# Patient Record
Sex: FEMALE | Race: OTHER | HISPANIC OR LATINO
[De-identification: names, ages, dates, MRNs, and addresses within clinical notes are randomized per-mention and may not be internally consistent; named-entity substitution may affect disease eponyms.]

---

## 2017-09-21 ENCOUNTER — RESULT REVIEW (OUTPATIENT)
Age: 32
End: 2017-09-21

## 2018-01-28 ENCOUNTER — EMERGENCY (EMERGENCY)
Facility: HOSPITAL | Age: 33
LOS: 1 days | Discharge: HOME | End: 2018-01-28
Admitting: EMERGENCY MEDICINE
Payer: MEDICAID

## 2018-01-28 VITALS
HEART RATE: 74 BPM | OXYGEN SATURATION: 98 % | RESPIRATION RATE: 18 BRPM | DIASTOLIC BLOOD PRESSURE: 68 MMHG | SYSTOLIC BLOOD PRESSURE: 107 MMHG | HEIGHT: 61 IN | TEMPERATURE: 99 F | WEIGHT: 130.07 LBS

## 2018-01-28 DIAGNOSIS — H60.01 ABSCESS OF RIGHT EXTERNAL EAR: ICD-10-CM

## 2018-01-28 DIAGNOSIS — Z88.0 ALLERGY STATUS TO PENICILLIN: ICD-10-CM

## 2018-01-28 DIAGNOSIS — H92.01 OTALGIA, RIGHT EAR: ICD-10-CM

## 2018-01-28 DIAGNOSIS — Z79.2 LONG TERM (CURRENT) USE OF ANTIBIOTICS: ICD-10-CM

## 2018-01-28 DIAGNOSIS — Z79.1 LONG TERM (CURRENT) USE OF NON-STEROIDAL ANTI-INFLAMMATORIES (NSAID): ICD-10-CM

## 2018-01-28 PROCEDURE — 99283 EMERGENCY DEPT VISIT LOW MDM: CPT

## 2018-01-28 PROCEDURE — 99284 EMERGENCY DEPT VISIT MOD MDM: CPT

## 2018-01-28 PROCEDURE — 87186 SC STD MICRODIL/AGAR DIL: CPT

## 2018-01-28 PROCEDURE — 87205 SMEAR GRAM STAIN: CPT

## 2018-01-28 PROCEDURE — 87070 CULTURE OTHR SPECIMN AEROBIC: CPT

## 2018-01-28 RX ORDER — AZTREONAM 2 G
1 VIAL (EA) INJECTION
Qty: 20 | Refills: 0 | OUTPATIENT
Start: 2018-01-28 | End: 2018-02-06

## 2018-01-28 RX ORDER — IBUPROFEN 200 MG
600 TABLET ORAL ONCE
Qty: 0 | Refills: 0 | Status: COMPLETED | OUTPATIENT
Start: 2018-01-28 | End: 2018-01-28

## 2018-01-28 RX ORDER — CIPROFLOXACIN AND DEXAMETHASONE 3; 1 MG/ML; MG/ML
4 SUSPENSION/ DROPS AURICULAR (OTIC)
Qty: 1 | Refills: 0 | OUTPATIENT
Start: 2018-01-28 | End: 2018-02-03

## 2018-01-28 RX ORDER — IBUPROFEN 200 MG
1 TABLET ORAL
Qty: 30 | Refills: 0 | OUTPATIENT
Start: 2018-01-28 | End: 2018-02-06

## 2018-01-28 RX ADMIN — Medication 1 TABLET(S): at 18:44

## 2018-01-28 RX ADMIN — Medication 600 MILLIGRAM(S): at 18:39

## 2018-01-28 NOTE — ED PROVIDER NOTE - MEDICAL DECISION MAKING DETAILS
+ right earache with abscess to ear canal. + yellow pus expressed with insertion of speculum from visible abscess at opening of canal. improvement in swelling. will tx with bactrim, ciprodex and wound check in 24 hrs. return precautions d/w pt. cx sent.

## 2018-01-28 NOTE — ED PROVIDER NOTE - OBJECTIVE STATEMENT
33 y/o female with no sig PMHx c/o right earache x 3 days. Pt states ear has become swollen and painful. no d/c. + decrease hearing on right and subj fever. no URI sx's. no ha. no further complaints.

## 2018-01-28 NOTE — ED PROVIDER NOTE - ENMT, MLM
Airway patent, Nasal mucosa clear. Mouth with normal mucosa. Throat has no vesicles, no oropharyngeal exudates and uvula is midline. left tm unable to visualize due to cerumen impaction. no canal swelling. no d/c. right ear + swelling to meatus and canal. + pus expresses upon insertion of speculum. unable to visualize TM due to d/c and swelling. no erythema. no outer ear swelling. no mastoid tenderness.

## 2018-01-28 NOTE — ED ADULT NURSE NOTE - OBJECTIVE STATEMENT
Pt w no PMH presents to ED c/o R ear pain radiating down her jaw, feeling "shut", decreased hearing for 2-3 days. Also reports subjective fever last night. Denies taking any OTC analgesics for pain.

## 2018-01-29 LAB
GRAM STN FLD: SIGNIFICANT CHANGE UP
SPECIMEN SOURCE: SIGNIFICANT CHANGE UP

## 2018-02-01 LAB
-  CEFAZOLIN: SIGNIFICANT CHANGE UP
-  CLINDAMYCIN: SIGNIFICANT CHANGE UP
-  ERYTHROMYCIN: SIGNIFICANT CHANGE UP
-  LINEZOLID: SIGNIFICANT CHANGE UP
-  OXACILLIN: SIGNIFICANT CHANGE UP
-  RIFAMPIN: SIGNIFICANT CHANGE UP
-  TRIMETHOPRIM/SULFAMETHOXAZOLE: SIGNIFICANT CHANGE UP
-  VANCOMYCIN: SIGNIFICANT CHANGE UP
CULTURE RESULTS: SIGNIFICANT CHANGE UP
METHOD TYPE: SIGNIFICANT CHANGE UP
ORGANISM # SPEC MICROSCOPIC CNT: SIGNIFICANT CHANGE UP
ORGANISM # SPEC MICROSCOPIC CNT: SIGNIFICANT CHANGE UP
SPECIMEN SOURCE: SIGNIFICANT CHANGE UP

## 2018-03-25 ENCOUNTER — EMERGENCY (EMERGENCY)
Facility: HOSPITAL | Age: 33
LOS: 1 days | Discharge: HOME | End: 2018-03-25
Attending: EMERGENCY MEDICINE | Admitting: EMERGENCY MEDICINE
Payer: MEDICAID

## 2018-03-25 VITALS
DIASTOLIC BLOOD PRESSURE: 56 MMHG | HEART RATE: 60 BPM | TEMPERATURE: 99 F | OXYGEN SATURATION: 100 % | SYSTOLIC BLOOD PRESSURE: 91 MMHG | RESPIRATION RATE: 16 BRPM

## 2018-03-25 VITALS
OXYGEN SATURATION: 99 % | SYSTOLIC BLOOD PRESSURE: 103 MMHG | DIASTOLIC BLOOD PRESSURE: 62 MMHG | RESPIRATION RATE: 18 BRPM | WEIGHT: 134.92 LBS | HEART RATE: 62 BPM | HEIGHT: 61 IN | TEMPERATURE: 98 F

## 2018-03-25 DIAGNOSIS — Z3A.01 LESS THAN 8 WEEKS GESTATION OF PREGNANCY: ICD-10-CM

## 2018-03-25 DIAGNOSIS — Z88.0 ALLERGY STATUS TO PENICILLIN: ICD-10-CM

## 2018-03-25 DIAGNOSIS — R11.0 NAUSEA: ICD-10-CM

## 2018-03-25 DIAGNOSIS — Z91.013 ALLERGY TO SEAFOOD: ICD-10-CM

## 2018-03-25 DIAGNOSIS — O26.891 OTHER SPECIFIED PREGNANCY RELATED CONDITIONS, FIRST TRIMESTER: ICD-10-CM

## 2018-03-25 DIAGNOSIS — Z79.899 OTHER LONG TERM (CURRENT) DRUG THERAPY: ICD-10-CM

## 2018-03-25 LAB
ALBUMIN SERPL ELPH-MCNC: 4.4 G/DL — SIGNIFICANT CHANGE UP (ref 3.3–5)
ALP SERPL-CCNC: 42 U/L — SIGNIFICANT CHANGE UP (ref 40–120)
ALT FLD-CCNC: 17 U/L — SIGNIFICANT CHANGE UP (ref 10–45)
ANION GAP SERPL CALC-SCNC: 12 MMOL/L — SIGNIFICANT CHANGE UP (ref 5–17)
APPEARANCE UR: CLEAR — SIGNIFICANT CHANGE UP
AST SERPL-CCNC: 19 U/L — SIGNIFICANT CHANGE UP (ref 10–40)
BACTERIA # UR AUTO: PRESENT /HPF
BASOPHILS NFR BLD AUTO: 0.1 % — SIGNIFICANT CHANGE UP (ref 0–2)
BILIRUB SERPL-MCNC: 0.7 MG/DL — SIGNIFICANT CHANGE UP (ref 0.2–1.2)
BILIRUB UR-MCNC: NEGATIVE — SIGNIFICANT CHANGE UP
BUN SERPL-MCNC: 8 MG/DL — SIGNIFICANT CHANGE UP (ref 7–23)
CALCIUM SERPL-MCNC: 9.2 MG/DL — SIGNIFICANT CHANGE UP (ref 8.4–10.5)
CHLORIDE SERPL-SCNC: 99 MMOL/L — SIGNIFICANT CHANGE UP (ref 96–108)
CO2 SERPL-SCNC: 24 MMOL/L — SIGNIFICANT CHANGE UP (ref 22–31)
COLOR SPEC: YELLOW — SIGNIFICANT CHANGE UP
CREAT SERPL-MCNC: 0.5 MG/DL — SIGNIFICANT CHANGE UP (ref 0.5–1.3)
DIFF PNL FLD: NEGATIVE — SIGNIFICANT CHANGE UP
EOSINOPHIL NFR BLD AUTO: 0.6 % — SIGNIFICANT CHANGE UP (ref 0–6)
EPI CELLS # UR: (no result) /HPF (ref 0–5)
GLUCOSE SERPL-MCNC: 85 MG/DL — SIGNIFICANT CHANGE UP (ref 70–99)
GLUCOSE UR QL: NEGATIVE — SIGNIFICANT CHANGE UP
HCG SERPL-ACNC: HIGH MIU/ML
HCT VFR BLD CALC: 38 % — SIGNIFICANT CHANGE UP (ref 34.5–45)
HGB BLD-MCNC: 13 G/DL — SIGNIFICANT CHANGE UP (ref 11.5–15.5)
KETONES UR-MCNC: >=80 MG/DL
LEUKOCYTE ESTERASE UR-ACNC: NEGATIVE — SIGNIFICANT CHANGE UP
LYMPHOCYTES # BLD AUTO: 32.1 % — SIGNIFICANT CHANGE UP (ref 13–44)
MCHC RBC-ENTMCNC: 31.8 PG — SIGNIFICANT CHANGE UP (ref 27–34)
MCHC RBC-ENTMCNC: 34.2 G/DL — SIGNIFICANT CHANGE UP (ref 32–36)
MCV RBC AUTO: 92.9 FL — SIGNIFICANT CHANGE UP (ref 80–100)
MONOCYTES NFR BLD AUTO: 7.8 % — SIGNIFICANT CHANGE UP (ref 2–14)
NEUTROPHILS NFR BLD AUTO: 59.4 % — SIGNIFICANT CHANGE UP (ref 43–77)
NITRITE UR-MCNC: NEGATIVE — SIGNIFICANT CHANGE UP
PH UR: 7 — SIGNIFICANT CHANGE UP (ref 5–8)
PLATELET # BLD AUTO: 211 K/UL — SIGNIFICANT CHANGE UP (ref 150–400)
POTASSIUM SERPL-MCNC: 4 MMOL/L — SIGNIFICANT CHANGE UP (ref 3.5–5.3)
POTASSIUM SERPL-SCNC: 4 MMOL/L — SIGNIFICANT CHANGE UP (ref 3.5–5.3)
PROT SERPL-MCNC: 7.7 G/DL — SIGNIFICANT CHANGE UP (ref 6–8.3)
PROT UR-MCNC: (no result) MG/DL
RBC # BLD: 4.09 M/UL — SIGNIFICANT CHANGE UP (ref 3.8–5.2)
RBC # FLD: 12.1 % — SIGNIFICANT CHANGE UP (ref 10.3–16.9)
RBC CASTS # UR COMP ASSIST: < 5 /HPF — SIGNIFICANT CHANGE UP
SODIUM SERPL-SCNC: 135 MMOL/L — SIGNIFICANT CHANGE UP (ref 135–145)
SP GR SPEC: 1.02 — SIGNIFICANT CHANGE UP (ref 1–1.03)
UROBILINOGEN FLD QL: 0.2 E.U./DL — SIGNIFICANT CHANGE UP
WBC # BLD: 6.8 K/UL — SIGNIFICANT CHANGE UP (ref 3.8–10.5)
WBC # FLD AUTO: 6.8 K/UL — SIGNIFICANT CHANGE UP (ref 3.8–10.5)
WBC UR QL: < 5 /HPF — SIGNIFICANT CHANGE UP

## 2018-03-25 PROCEDURE — 99284 EMERGENCY DEPT VISIT MOD MDM: CPT

## 2018-03-25 PROCEDURE — 76817 TRANSVAGINAL US OBSTETRIC: CPT | Mod: 26

## 2018-03-25 PROCEDURE — 76801 OB US < 14 WKS SINGLE FETUS: CPT

## 2018-03-25 PROCEDURE — 99284 EMERGENCY DEPT VISIT MOD MDM: CPT | Mod: 25

## 2018-03-25 PROCEDURE — 80053 COMPREHEN METABOLIC PANEL: CPT

## 2018-03-25 PROCEDURE — 87086 URINE CULTURE/COLONY COUNT: CPT

## 2018-03-25 PROCEDURE — 84702 CHORIONIC GONADOTROPIN TEST: CPT

## 2018-03-25 PROCEDURE — 96374 THER/PROPH/DIAG INJ IV PUSH: CPT

## 2018-03-25 PROCEDURE — 85025 COMPLETE CBC W/AUTO DIFF WBC: CPT

## 2018-03-25 PROCEDURE — 76801 OB US < 14 WKS SINGLE FETUS: CPT | Mod: 26

## 2018-03-25 PROCEDURE — 36415 COLL VENOUS BLD VENIPUNCTURE: CPT

## 2018-03-25 PROCEDURE — 81001 URINALYSIS AUTO W/SCOPE: CPT

## 2018-03-25 PROCEDURE — 76817 TRANSVAGINAL US OBSTETRIC: CPT

## 2018-03-25 RX ORDER — SODIUM CHLORIDE 9 MG/ML
1000 INJECTION, SOLUTION INTRAVENOUS
Qty: 0 | Refills: 0 | Status: DISCONTINUED | OUTPATIENT
Start: 2018-03-25 | End: 2018-03-29

## 2018-03-25 RX ORDER — SODIUM CHLORIDE 9 MG/ML
1000 INJECTION INTRAMUSCULAR; INTRAVENOUS; SUBCUTANEOUS ONCE
Qty: 0 | Refills: 0 | Status: COMPLETED | OUTPATIENT
Start: 2018-03-25 | End: 2018-03-25

## 2018-03-25 RX ORDER — METOCLOPRAMIDE HCL 10 MG
10 TABLET ORAL ONCE
Qty: 0 | Refills: 0 | Status: COMPLETED | OUTPATIENT
Start: 2018-03-25 | End: 2018-03-25

## 2018-03-25 RX ORDER — METOCLOPRAMIDE HCL 10 MG
1 TABLET ORAL
Qty: 20 | Refills: 0 | OUTPATIENT
Start: 2018-03-25

## 2018-03-25 RX ADMIN — Medication 10 MILLIGRAM(S): at 09:00

## 2018-03-25 RX ADMIN — SODIUM CHLORIDE 250 MILLILITER(S): 9 INJECTION, SOLUTION INTRAVENOUS at 11:58

## 2018-03-25 RX ADMIN — SODIUM CHLORIDE 1000 MILLILITER(S): 9 INJECTION INTRAMUSCULAR; INTRAVENOUS; SUBCUTANEOUS at 08:59

## 2018-03-25 NOTE — ED ADULT NURSE NOTE - CHPI ED SYMPTOMS NEG
no tingling/no chills/no decreased eating/drinking/no dizziness/no fever/no vomiting/no pain/no numbness

## 2018-03-25 NOTE — ED ADULT NURSE NOTE - CHIEF COMPLAINT QUOTE
Patient c/o generalized weakness , body ache , nausea , lower abdominal pain and chills for 1 week . Denies any vomiting nor fever . LMP 02/10/18 .

## 2018-03-25 NOTE — ED PROVIDER NOTE - OBJECTIVE STATEMENT
34 y/o f  presents c/o feeling nauseous, less energy, left pelvic pain for the past week.  Pt stating LMP was 2/10/18, reports hasn't taken a pregnancy test.  Denies vaginal bleeding, vomiting, fever, diarrhea, all other ROS negative.

## 2018-03-25 NOTE — ED ADULT TRIAGE NOTE - CHIEF COMPLAINT QUOTE
Patient c/o generalized weakness , body ache , nausea , lower abdominal pain and chills for 1 week . Denies any vomiting nor fever . Patient c/o generalized weakness , body ache , nausea , lower abdominal pain and chills for 1 week . Denies any vomiting nor fever . LMP 02/10/18 .

## 2018-03-25 NOTE — ED PROVIDER NOTE - MEDICAL DECISION MAKING DETAILS
32 y/o f  presents c/o nausea, fatigue, left pelvic pain x 1 week; urine hcg +, ~5 weeks pregnant by dates, nontender abd, will check serum hcg, official u/s.

## 2018-03-25 NOTE — ED ADULT NURSE NOTE - OBJECTIVE STATEMENT
Pt w no PMH and not on any daily medications presents to ED with c/o feeling tired for the past week, sleeping for long stretches of time, decreased appetite and mild nausea without vomiting. She denies any fevers, chills, diarrhea, night sweats, recent weight loss, recent travel. Pt states " I woke up today feelign dehydrated and my body feels like its inflamed". LMP 2/10/18. Pt w no PMH and not on any daily medications presents to ED with c/o feeling tired for the past week, sleeping for long stretches of time, decreased appetite and mild nausea without vomiting. She denies any fevers, chills, diarrhea, night sweats, recent weight loss, recent travel. Pt states " I woke up today feeling dehydrated and my body feels like its inflamed". LMP 2/10/18. Denies any urinary sx.

## 2018-03-25 NOTE — ED ADULT NURSE REASSESSMENT NOTE - NS ED NURSE REASSESS COMMENT FT1
Pt aware of  a positive pregnancy test. Pending ultrasound at this time. Medicated for nausea as ordered. Plan of care discussed w pt and she is in agreement.

## 2018-03-25 NOTE — ED PROVIDER NOTE - ATTENDING CONTRIBUTION TO CARE
32 y/o f  presents c/o feeling nauseous, less energy, left pelvic pain for the past week.  Pt stating LMP was 2/10/18, reports hasn't taken a pregnancy test.  Denies vaginal bleeding, vomiting, fever, diarrhea, all other ROS negative.  Pt with + preg in ED, US with IUP with [facility].  Pt aware to follow up with OB.

## 2018-03-28 ENCOUNTER — EMERGENCY (EMERGENCY)
Facility: HOSPITAL | Age: 33
LOS: 1 days | Discharge: HOME | End: 2018-03-28
Attending: EMERGENCY MEDICINE | Admitting: EMERGENCY MEDICINE
Payer: MEDICAID

## 2018-03-28 VITALS
WEIGHT: 139.99 LBS | SYSTOLIC BLOOD PRESSURE: 104 MMHG | TEMPERATURE: 99 F | RESPIRATION RATE: 18 BRPM | DIASTOLIC BLOOD PRESSURE: 67 MMHG | OXYGEN SATURATION: 99 % | HEART RATE: 60 BPM

## 2018-03-28 DIAGNOSIS — O21.0 MILD HYPEREMESIS GRAVIDARUM: ICD-10-CM

## 2018-03-28 DIAGNOSIS — O21.9 VOMITING OF PREGNANCY, UNSPECIFIED: ICD-10-CM

## 2018-03-28 DIAGNOSIS — Z3A.01 LESS THAN 8 WEEKS GESTATION OF PREGNANCY: ICD-10-CM

## 2018-03-28 DIAGNOSIS — Z88.0 ALLERGY STATUS TO PENICILLIN: ICD-10-CM

## 2018-03-28 DIAGNOSIS — Z91.013 ALLERGY TO SEAFOOD: ICD-10-CM

## 2018-03-28 DIAGNOSIS — Z79.899 OTHER LONG TERM (CURRENT) DRUG THERAPY: ICD-10-CM

## 2018-03-28 LAB
ALBUMIN SERPL ELPH-MCNC: 4 G/DL — SIGNIFICANT CHANGE UP (ref 3.3–5)
ALP SERPL-CCNC: 41 U/L — SIGNIFICANT CHANGE UP (ref 40–120)
ALT FLD-CCNC: 15 U/L — SIGNIFICANT CHANGE UP (ref 10–45)
ANION GAP SERPL CALC-SCNC: 11 MMOL/L — SIGNIFICANT CHANGE UP (ref 5–17)
APPEARANCE UR: CLEAR — SIGNIFICANT CHANGE UP
AST SERPL-CCNC: 21 U/L — SIGNIFICANT CHANGE UP (ref 10–40)
BASOPHILS NFR BLD AUTO: 0.3 % — SIGNIFICANT CHANGE UP (ref 0–2)
BILIRUB SERPL-MCNC: 0.3 MG/DL — SIGNIFICANT CHANGE UP (ref 0.2–1.2)
BILIRUB UR-MCNC: NEGATIVE — SIGNIFICANT CHANGE UP
BUN SERPL-MCNC: 8 MG/DL — SIGNIFICANT CHANGE UP (ref 7–23)
CALCIUM SERPL-MCNC: 9.5 MG/DL — SIGNIFICANT CHANGE UP (ref 8.4–10.5)
CHLORIDE SERPL-SCNC: 98 MMOL/L — SIGNIFICANT CHANGE UP (ref 96–108)
CO2 SERPL-SCNC: 26 MMOL/L — SIGNIFICANT CHANGE UP (ref 22–31)
COLOR SPEC: YELLOW — SIGNIFICANT CHANGE UP
CREAT SERPL-MCNC: 0.52 MG/DL — SIGNIFICANT CHANGE UP (ref 0.5–1.3)
DIFF PNL FLD: NEGATIVE — SIGNIFICANT CHANGE UP
EOSINOPHIL NFR BLD AUTO: 1.5 % — SIGNIFICANT CHANGE UP (ref 0–6)
GLUCOSE SERPL-MCNC: 87 MG/DL — SIGNIFICANT CHANGE UP (ref 70–99)
GLUCOSE UR QL: NEGATIVE — SIGNIFICANT CHANGE UP
HCT VFR BLD CALC: 33.1 % — LOW (ref 34.5–45)
HGB BLD-MCNC: 11.3 G/DL — LOW (ref 11.5–15.5)
KETONES UR-MCNC: NEGATIVE — SIGNIFICANT CHANGE UP
LEUKOCYTE ESTERASE UR-ACNC: NEGATIVE — SIGNIFICANT CHANGE UP
LYMPHOCYTES # BLD AUTO: 35.7 % — SIGNIFICANT CHANGE UP (ref 13–44)
MCHC RBC-ENTMCNC: 31.7 PG — SIGNIFICANT CHANGE UP (ref 27–34)
MCHC RBC-ENTMCNC: 34.1 G/DL — SIGNIFICANT CHANGE UP (ref 32–36)
MCV RBC AUTO: 93 FL — SIGNIFICANT CHANGE UP (ref 80–100)
MONOCYTES NFR BLD AUTO: 9.9 % — SIGNIFICANT CHANGE UP (ref 2–14)
NEUTROPHILS NFR BLD AUTO: 52.6 % — SIGNIFICANT CHANGE UP (ref 43–77)
NITRITE UR-MCNC: NEGATIVE — SIGNIFICANT CHANGE UP
PH UR: 7 — SIGNIFICANT CHANGE UP (ref 5–8)
PLATELET # BLD AUTO: 201 K/UL — SIGNIFICANT CHANGE UP (ref 150–400)
POTASSIUM SERPL-MCNC: 3.9 MMOL/L — SIGNIFICANT CHANGE UP (ref 3.5–5.3)
POTASSIUM SERPL-SCNC: 3.9 MMOL/L — SIGNIFICANT CHANGE UP (ref 3.5–5.3)
PROT SERPL-MCNC: 7.5 G/DL — SIGNIFICANT CHANGE UP (ref 6–8.3)
PROT UR-MCNC: NEGATIVE MG/DL — SIGNIFICANT CHANGE UP
RBC # BLD: 3.56 M/UL — LOW (ref 3.8–5.2)
RBC # FLD: 12 % — SIGNIFICANT CHANGE UP (ref 10.3–16.9)
SODIUM SERPL-SCNC: 135 MMOL/L — SIGNIFICANT CHANGE UP (ref 135–145)
SP GR SPEC: 1.02 — SIGNIFICANT CHANGE UP (ref 1–1.03)
UROBILINOGEN FLD QL: 1 E.U./DL — SIGNIFICANT CHANGE UP
WBC # BLD: 9.9 K/UL — SIGNIFICANT CHANGE UP (ref 3.8–10.5)
WBC # FLD AUTO: 9.9 K/UL — SIGNIFICANT CHANGE UP (ref 3.8–10.5)

## 2018-03-28 PROCEDURE — 85025 COMPLETE CBC W/AUTO DIFF WBC: CPT

## 2018-03-28 PROCEDURE — 84702 CHORIONIC GONADOTROPIN TEST: CPT

## 2018-03-28 PROCEDURE — 81003 URINALYSIS AUTO W/O SCOPE: CPT

## 2018-03-28 PROCEDURE — 87086 URINE CULTURE/COLONY COUNT: CPT

## 2018-03-28 PROCEDURE — 36415 COLL VENOUS BLD VENIPUNCTURE: CPT

## 2018-03-28 PROCEDURE — 99284 EMERGENCY DEPT VISIT MOD MDM: CPT

## 2018-03-28 PROCEDURE — 80053 COMPREHEN METABOLIC PANEL: CPT

## 2018-03-28 PROCEDURE — 99284 EMERGENCY DEPT VISIT MOD MDM: CPT | Mod: 25

## 2018-03-28 PROCEDURE — 96374 THER/PROPH/DIAG INJ IV PUSH: CPT

## 2018-03-28 RX ORDER — METOCLOPRAMIDE HCL 10 MG
10 TABLET ORAL ONCE
Qty: 0 | Refills: 0 | Status: COMPLETED | OUTPATIENT
Start: 2018-03-28 | End: 2018-03-28

## 2018-03-28 RX ORDER — SODIUM CHLORIDE 9 MG/ML
1000 INJECTION, SOLUTION INTRAVENOUS
Qty: 0 | Refills: 0 | Status: DISCONTINUED | OUTPATIENT
Start: 2018-03-28 | End: 2018-04-01

## 2018-03-28 RX ORDER — DOXYLAMINE SUCCINATE AND PYRIDOXINE HYDROCHLORIDE, DELAYED RELEASE TABLETS 10 MG/10 MG 10; 10 MG/1; MG/1
1 TABLET, DELAYED RELEASE ORAL
Qty: 60 | Refills: 0 | OUTPATIENT
Start: 2018-03-28 | End: 2018-04-26

## 2018-03-28 RX ORDER — DOXYLAMINE SUCCINATE AND PYRIDOXINE HYDROCHLORIDE, DELAYED RELEASE TABLETS 10 MG/10 MG 10; 10 MG/1; MG/1
2 TABLET, DELAYED RELEASE ORAL
Qty: 30 | Refills: 0 | OUTPATIENT
Start: 2018-03-28

## 2018-03-28 RX ADMIN — Medication 10 MILLIGRAM(S): at 20:41

## 2018-03-28 RX ADMIN — SODIUM CHLORIDE 500 MILLILITER(S): 9 INJECTION, SOLUTION INTRAVENOUS at 20:41

## 2018-03-28 NOTE — ED PROVIDER NOTE - MEDICAL DECISION MAKING DETAILS
here w/ continued hyperemesis. labs wnl, and UA without ketones this time around so pt is tolerating better po than prior. will start her on diclegis and she can alternate w/ metoclopramide. DC home in NAD with strict return precautions given.

## 2018-03-28 NOTE — ED PROVIDER NOTE - OBJECTIVE STATEMENT
34yo F  7wks pregnant (recent US w/ +IUP 3 days ago), here w/ complaint of continued N/V since she was seen 3 days ago. Had ketones on urine in that visit, did better after reglan, and dc home on reglan. states it helps w/ her nausea but still w/ poor appetite, and she feels like the medicine makes her very sleepy so shes afraid to take it as often as prescribed. Not taking b6 or doxylamine. No urinary dysuria/urgency/freq/hematuria.

## 2018-03-28 NOTE — ED ADULT NURSE NOTE - OBJECTIVE STATEMENT
Patient comes in ambulatory into [facility]. Patient reports, "I have pain in my stomach, I am nausea, dehydrated." Patient reports onset was 2 weeks. Patient reports 2 episodes of vomiting a day for 1 week.

## 2018-03-28 NOTE — ED ADULT TRIAGE NOTE - CHIEF COMPLAINT QUOTE
pt is 7 weeks pregnant c/o n/v and abdominal pain since x 1 week. pt states "I was here on Sunday and they gave me medication but I'm not feeling better."

## 2018-03-30 LAB
CULTURE RESULTS: SIGNIFICANT CHANGE UP
SPECIMEN SOURCE: SIGNIFICANT CHANGE UP

## 2018-04-25 NOTE — ED ADULT NURSE NOTE - PRO INTERPRETER NEED 2
Patient Specific Counseling (Will Not Stick From Patient To Patient): May consider Excision if cyst becomes bothersome Detail Level: Simple Detail Level: Generalized English

## 2018-07-19 ENCOUNTER — OUTPATIENT (OUTPATIENT)
Dept: EMERGENCY DEPT | Facility: HOSPITAL | Age: 33
LOS: 1 days | Discharge: HOME | End: 2018-07-19
Payer: MEDICAID

## 2018-07-19 VITALS
DIASTOLIC BLOOD PRESSURE: 63 MMHG | TEMPERATURE: 98 F | WEIGHT: 151.9 LBS | HEART RATE: 80 BPM | SYSTOLIC BLOOD PRESSURE: 100 MMHG | OXYGEN SATURATION: 99 % | RESPIRATION RATE: 18 BRPM

## 2018-07-19 PROCEDURE — 99285 EMERGENCY DEPT VISIT HI MDM: CPT | Mod: 25

## 2018-07-19 NOTE — ED PROVIDER NOTE - OBJECTIVE STATEMENT
33 yof pw fatigue, malaise, ongoing for days, also lower back pain to groin, cramping x 2 days, no bleeding or discharge.  felt fetal movement.  +congestion.  no sick contact.  +vomiting.

## 2018-07-19 NOTE — ED PROVIDER NOTE - PROGRESS NOTE DETAILS
nontoxic appearing pt, avss, possibly nonspecific URI sx, also can obtain UA to eval for infectious process, hydration as appropriate

## 2018-07-19 NOTE — ED PROVIDER NOTE - MEDICAL DECISION MAKING DETAILS
24 wk GA, no bleeding, +fetal movement per pt, gravid on exam, avss, will send to L&D for NST, can send UA and lab work as appropriate in L&D

## 2018-07-19 NOTE — ED PROVIDER NOTE - PHYSICAL EXAMINATION
CON: ao x 3, HENMT: clear oropharynx, soft neck, HEAD: atraumatic, CV: rrr, equal pulses b/l, RESP: cta b/l, GI: +BS, soft, gravid, nontender, no rebound, no guarding, SKIN: no rash, MSK: no deformities, NEURO: no gross motor or sensory deficit

## 2018-07-20 LAB
ALBUMIN SERPL ELPH-MCNC: 3.7 G/DL — SIGNIFICANT CHANGE UP (ref 3.3–5)
ALP SERPL-CCNC: 66 U/L — SIGNIFICANT CHANGE UP (ref 40–120)
ALT FLD-CCNC: 11 U/L — SIGNIFICANT CHANGE UP (ref 10–45)
AMYLASE P1 CFR SERPL: 46 U/L — SIGNIFICANT CHANGE UP (ref 25–125)
ANION GAP SERPL CALC-SCNC: 14 MMOL/L — SIGNIFICANT CHANGE UP (ref 5–17)
APPEARANCE UR: CLEAR — SIGNIFICANT CHANGE UP
AST SERPL-CCNC: 15 U/L — SIGNIFICANT CHANGE UP (ref 10–40)
BASOPHILS NFR BLD AUTO: 0.2 % — SIGNIFICANT CHANGE UP (ref 0–2)
BILIRUB SERPL-MCNC: 0.2 MG/DL — SIGNIFICANT CHANGE UP (ref 0.2–1.2)
BILIRUB UR-MCNC: NEGATIVE — SIGNIFICANT CHANGE UP
BUN SERPL-MCNC: 9 MG/DL — SIGNIFICANT CHANGE UP (ref 7–23)
CALCIUM SERPL-MCNC: 8.9 MG/DL — SIGNIFICANT CHANGE UP (ref 8.4–10.5)
CHLORIDE SERPL-SCNC: 99 MMOL/L — SIGNIFICANT CHANGE UP (ref 96–108)
CO2 SERPL-SCNC: 22 MMOL/L — SIGNIFICANT CHANGE UP (ref 22–31)
COLOR SPEC: YELLOW — SIGNIFICANT CHANGE UP
CREAT SERPL-MCNC: 0.4 MG/DL — LOW (ref 0.5–1.3)
DIFF PNL FLD: NEGATIVE — SIGNIFICANT CHANGE UP
EOSINOPHIL NFR BLD AUTO: 1 % — SIGNIFICANT CHANGE UP (ref 0–6)
GLUCOSE SERPL-MCNC: 93 MG/DL — SIGNIFICANT CHANGE UP (ref 70–99)
GLUCOSE UR QL: NEGATIVE — SIGNIFICANT CHANGE UP
HCT VFR BLD CALC: 32.5 % — LOW (ref 34.5–45)
HGB BLD-MCNC: 10.9 G/DL — LOW (ref 11.5–15.5)
KETONES UR-MCNC: 15 MG/DL
LEUKOCYTE ESTERASE UR-ACNC: NEGATIVE — SIGNIFICANT CHANGE UP
LIDOCAIN IGE QN: 16 U/L — SIGNIFICANT CHANGE UP (ref 7–60)
LYMPHOCYTES # BLD AUTO: 25 % — SIGNIFICANT CHANGE UP (ref 13–44)
MCHC RBC-ENTMCNC: 31.7 PG — SIGNIFICANT CHANGE UP (ref 27–34)
MCHC RBC-ENTMCNC: 33.5 G/DL — SIGNIFICANT CHANGE UP (ref 32–36)
MCV RBC AUTO: 94.5 FL — SIGNIFICANT CHANGE UP (ref 80–100)
MONOCYTES NFR BLD AUTO: 6.9 % — SIGNIFICANT CHANGE UP (ref 2–14)
NEUTROPHILS NFR BLD AUTO: 66.9 % — SIGNIFICANT CHANGE UP (ref 43–77)
NITRITE UR-MCNC: NEGATIVE — SIGNIFICANT CHANGE UP
PH UR: 6 — SIGNIFICANT CHANGE UP (ref 5–8)
PLATELET # BLD AUTO: 198 K/UL — SIGNIFICANT CHANGE UP (ref 150–400)
POTASSIUM SERPL-MCNC: 3.6 MMOL/L — SIGNIFICANT CHANGE UP (ref 3.5–5.3)
POTASSIUM SERPL-SCNC: 3.6 MMOL/L — SIGNIFICANT CHANGE UP (ref 3.5–5.3)
PROT SERPL-MCNC: 7 G/DL — SIGNIFICANT CHANGE UP (ref 6–8.3)
PROT UR-MCNC: ABNORMAL MG/DL
RBC # BLD: 3.44 M/UL — LOW (ref 3.8–5.2)
RBC # FLD: 12.4 % — SIGNIFICANT CHANGE UP (ref 10.3–16.9)
SODIUM SERPL-SCNC: 135 MMOL/L — SIGNIFICANT CHANGE UP (ref 135–145)
SP GR SPEC: >=1.03 — SIGNIFICANT CHANGE UP (ref 1–1.03)
UROBILINOGEN FLD QL: 0.2 E.U./DL — SIGNIFICANT CHANGE UP
WBC # BLD: 9.3 K/UL — SIGNIFICANT CHANGE UP (ref 3.8–10.5)
WBC # FLD AUTO: 9.3 K/UL — SIGNIFICANT CHANGE UP (ref 3.8–10.5)

## 2018-07-20 PROCEDURE — 83690 ASSAY OF LIPASE: CPT

## 2018-07-20 PROCEDURE — 82150 ASSAY OF AMYLASE: CPT

## 2018-07-20 PROCEDURE — 36415 COLL VENOUS BLD VENIPUNCTURE: CPT

## 2018-07-20 PROCEDURE — 80053 COMPREHEN METABOLIC PANEL: CPT

## 2018-07-20 PROCEDURE — 99285 EMERGENCY DEPT VISIT HI MDM: CPT

## 2018-07-20 PROCEDURE — 85025 COMPLETE CBC W/AUTO DIFF WBC: CPT

## 2018-07-20 PROCEDURE — 81001 URINALYSIS AUTO W/SCOPE: CPT

## 2018-07-20 RX ORDER — SODIUM CHLORIDE 9 MG/ML
1000 INJECTION, SOLUTION INTRAVENOUS
Qty: 0 | Refills: 0 | Status: DISCONTINUED | OUTPATIENT
Start: 2018-07-20 | End: 2018-07-20

## 2018-07-20 RX ORDER — ACETAMINOPHEN 500 MG
1000 TABLET ORAL ONCE
Qty: 0 | Refills: 0 | Status: COMPLETED | OUTPATIENT
Start: 2018-07-20 | End: 2018-07-20

## 2018-07-20 RX ADMIN — Medication 400 MILLIGRAM(S): at 03:00

## 2018-07-23 DIAGNOSIS — Z3A.00 WEEKS OF GESTATION OF PREGNANCY NOT SPECIFIED: ICD-10-CM

## 2018-07-23 DIAGNOSIS — O26.899 OTHER SPECIFIED PREGNANCY RELATED CONDITIONS, UNSPECIFIED TRIMESTER: ICD-10-CM

## 2018-09-11 ENCOUNTER — OUTPATIENT (OUTPATIENT)
Dept: OUTPATIENT SERVICES | Facility: HOSPITAL | Age: 33
LOS: 1 days | End: 2018-09-11
Payer: MEDICAID

## 2018-09-11 DIAGNOSIS — O26.899 OTHER SPECIFIED PREGNANCY RELATED CONDITIONS, UNSPECIFIED TRIMESTER: ICD-10-CM

## 2018-09-11 DIAGNOSIS — Z3A.00 WEEKS OF GESTATION OF PREGNANCY NOT SPECIFIED: ICD-10-CM

## 2018-09-11 LAB
APPEARANCE UR: CLEAR — SIGNIFICANT CHANGE UP
BILIRUB UR-MCNC: NEGATIVE — SIGNIFICANT CHANGE UP
COLOR SPEC: YELLOW — SIGNIFICANT CHANGE UP
DIFF PNL FLD: NEGATIVE — SIGNIFICANT CHANGE UP
GLUCOSE UR QL: NEGATIVE — SIGNIFICANT CHANGE UP
KETONES UR-MCNC: NEGATIVE — SIGNIFICANT CHANGE UP
LEUKOCYTE ESTERASE UR-ACNC: NEGATIVE — SIGNIFICANT CHANGE UP
NITRITE UR-MCNC: NEGATIVE — SIGNIFICANT CHANGE UP
PH UR: 6 — SIGNIFICANT CHANGE UP (ref 5–8)
PROT UR-MCNC: NEGATIVE MG/DL — SIGNIFICANT CHANGE UP
SP GR SPEC: 1.02 — SIGNIFICANT CHANGE UP (ref 1–1.03)
UROBILINOGEN FLD QL: 0.2 E.U./DL — SIGNIFICANT CHANGE UP

## 2018-09-11 PROCEDURE — 81003 URINALYSIS AUTO W/O SCOPE: CPT

## 2018-09-11 PROCEDURE — 99214 OFFICE O/P EST MOD 30 MIN: CPT

## 2018-09-11 RX ORDER — ACETAMINOPHEN 500 MG
650 TABLET ORAL ONCE
Qty: 0 | Refills: 0 | Status: COMPLETED | OUTPATIENT
Start: 2018-09-11 | End: 2018-09-11

## 2018-09-11 RX ADMIN — Medication 650 MILLIGRAM(S): at 04:00

## 2018-10-18 ENCOUNTER — OUTPATIENT (OUTPATIENT)
Dept: OUTPATIENT SERVICES | Facility: HOSPITAL | Age: 33
LOS: 1 days | End: 2018-10-18
Payer: MEDICAID

## 2018-10-18 DIAGNOSIS — O26.899 OTHER SPECIFIED PREGNANCY RELATED CONDITIONS, UNSPECIFIED TRIMESTER: ICD-10-CM

## 2018-10-18 DIAGNOSIS — Z3A.00 WEEKS OF GESTATION OF PREGNANCY NOT SPECIFIED: ICD-10-CM

## 2018-10-18 PROCEDURE — 99214 OFFICE O/P EST MOD 30 MIN: CPT

## 2018-11-11 ENCOUNTER — OUTPATIENT (OUTPATIENT)
Dept: OUTPATIENT SERVICES | Facility: HOSPITAL | Age: 33
LOS: 1 days | End: 2018-11-11
Payer: MEDICAID

## 2018-11-11 DIAGNOSIS — Z3A.00 WEEKS OF GESTATION OF PREGNANCY NOT SPECIFIED: ICD-10-CM

## 2018-11-11 DIAGNOSIS — O26.899 OTHER SPECIFIED PREGNANCY RELATED CONDITIONS, UNSPECIFIED TRIMESTER: ICD-10-CM

## 2018-11-11 PROCEDURE — 99214 OFFICE O/P EST MOD 30 MIN: CPT

## 2018-11-11 PROCEDURE — 76818 FETAL BIOPHYS PROFILE W/NST: CPT

## 2018-11-11 PROCEDURE — 83986 ASSAY PH BODY FLUID NOS: CPT

## 2018-11-15 ENCOUNTER — INPATIENT (INPATIENT)
Facility: HOSPITAL | Age: 33
LOS: 1 days | Discharge: HOME | End: 2018-11-17
Attending: PEDIATRICS | Admitting: PEDIATRICS
Payer: MEDICAID

## 2018-11-15 ENCOUNTER — RESULT REVIEW (OUTPATIENT)
Age: 33
End: 2018-11-15

## 2018-11-15 VITALS
RESPIRATION RATE: 18 BRPM | SYSTOLIC BLOOD PRESSURE: 116 MMHG | OXYGEN SATURATION: 100 % | DIASTOLIC BLOOD PRESSURE: 70 MMHG | TEMPERATURE: 98 F | HEART RATE: 76 BPM

## 2018-11-15 DIAGNOSIS — Z3A.00 WEEKS OF GESTATION OF PREGNANCY NOT SPECIFIED: ICD-10-CM

## 2018-11-15 DIAGNOSIS — O26.899 OTHER SPECIFIED PREGNANCY RELATED CONDITIONS, UNSPECIFIED TRIMESTER: ICD-10-CM

## 2018-11-15 LAB
HCT VFR BLD CALC: 32.7 % — LOW (ref 34.5–45)
HGB BLD-MCNC: 10.5 G/DL — LOW (ref 11.5–15.5)
MCHC RBC-ENTMCNC: 28.4 PG — SIGNIFICANT CHANGE UP (ref 27–34)
MCHC RBC-ENTMCNC: 32.1 G/DL — SIGNIFICANT CHANGE UP (ref 32–36)
MCV RBC AUTO: 88.4 FL — SIGNIFICANT CHANGE UP (ref 80–100)
PLATELET # BLD AUTO: 171 K/UL — SIGNIFICANT CHANGE UP (ref 150–400)
RBC # BLD: 3.7 M/UL — LOW (ref 3.8–5.2)
RBC # FLD: 13.6 % — SIGNIFICANT CHANGE UP (ref 10.3–16.9)
T PALLIDUM AB TITR SER: NEGATIVE — SIGNIFICANT CHANGE UP
WBC # BLD: 13.5 K/UL — HIGH (ref 3.8–10.5)
WBC # FLD AUTO: 13.5 K/UL — HIGH (ref 3.8–10.5)

## 2018-11-15 RX ORDER — MAGNESIUM HYDROXIDE 400 MG/1
30 TABLET, CHEWABLE ORAL
Qty: 0 | Refills: 0 | Status: DISCONTINUED | OUTPATIENT
Start: 2018-11-15 | End: 2018-11-17

## 2018-11-15 RX ORDER — GLYCERIN ADULT
1 SUPPOSITORY, RECTAL RECTAL AT BEDTIME
Qty: 0 | Refills: 0 | Status: DISCONTINUED | OUTPATIENT
Start: 2018-11-15 | End: 2018-11-17

## 2018-11-15 RX ORDER — LANOLIN
1 OINTMENT (GRAM) TOPICAL EVERY 6 HOURS
Qty: 0 | Refills: 0 | Status: DISCONTINUED | OUTPATIENT
Start: 2018-11-15 | End: 2018-11-17

## 2018-11-15 RX ORDER — AER TRAVELER 0.5 G/1
1 SOLUTION RECTAL; TOPICAL EVERY 4 HOURS
Qty: 0 | Refills: 0 | Status: DISCONTINUED | OUTPATIENT
Start: 2018-11-15 | End: 2018-11-17

## 2018-11-15 RX ORDER — PRAMOXINE HYDROCHLORIDE 150 MG/15G
1 AEROSOL, FOAM RECTAL EVERY 4 HOURS
Qty: 0 | Refills: 0 | Status: DISCONTINUED | OUTPATIENT
Start: 2018-11-15 | End: 2018-11-17

## 2018-11-15 RX ORDER — OXYTOCIN 10 UNIT/ML
41.67 VIAL (ML) INJECTION
Qty: 20 | Refills: 0 | Status: DISCONTINUED | OUTPATIENT
Start: 2018-11-15 | End: 2018-11-17

## 2018-11-15 RX ORDER — INFLUENZA VIRUS VACCINE 15; 15; 15; 15 UG/.5ML; UG/.5ML; UG/.5ML; UG/.5ML
0.5 SUSPENSION INTRAMUSCULAR ONCE
Qty: 0 | Refills: 0 | Status: DISCONTINUED | OUTPATIENT
Start: 2018-11-15 | End: 2018-11-15

## 2018-11-15 RX ORDER — TETANUS TOXOID, REDUCED DIPHTHERIA TOXOID AND ACELLULAR PERTUSSIS VACCINE, ADSORBED 5; 2.5; 8; 8; 2.5 [IU]/.5ML; [IU]/.5ML; UG/.5ML; UG/.5ML; UG/.5ML
0.5 SUSPENSION INTRAMUSCULAR ONCE
Qty: 0 | Refills: 0 | Status: DISCONTINUED | OUTPATIENT
Start: 2018-11-15 | End: 2018-11-17

## 2018-11-15 RX ORDER — SODIUM CHLORIDE 9 MG/ML
3 INJECTION INTRAMUSCULAR; INTRAVENOUS; SUBCUTANEOUS EVERY 8 HOURS
Qty: 0 | Refills: 0 | Status: DISCONTINUED | OUTPATIENT
Start: 2018-11-15 | End: 2018-11-17

## 2018-11-15 RX ORDER — INFLUENZA VIRUS VACCINE 15; 15; 15; 15 UG/.5ML; UG/.5ML; UG/.5ML; UG/.5ML
0.5 SUSPENSION INTRAMUSCULAR ONCE
Qty: 0 | Refills: 0 | Status: COMPLETED | OUTPATIENT
Start: 2018-11-15 | End: 2018-11-17

## 2018-11-15 RX ORDER — DIPHENHYDRAMINE HCL 50 MG
25 CAPSULE ORAL EVERY 6 HOURS
Qty: 0 | Refills: 0 | Status: DISCONTINUED | OUTPATIENT
Start: 2018-11-15 | End: 2018-11-17

## 2018-11-15 RX ORDER — HYDROCORTISONE 1 %
1 OINTMENT (GRAM) TOPICAL EVERY 4 HOURS
Qty: 0 | Refills: 0 | Status: DISCONTINUED | OUTPATIENT
Start: 2018-11-15 | End: 2018-11-17

## 2018-11-15 RX ORDER — OXYCODONE AND ACETAMINOPHEN 5; 325 MG/1; MG/1
2 TABLET ORAL EVERY 6 HOURS
Qty: 0 | Refills: 0 | Status: DISCONTINUED | OUTPATIENT
Start: 2018-11-15 | End: 2018-11-17

## 2018-11-15 RX ORDER — ACETAMINOPHEN 500 MG
650 TABLET ORAL EVERY 6 HOURS
Qty: 0 | Refills: 0 | Status: DISCONTINUED | OUTPATIENT
Start: 2018-11-15 | End: 2018-11-17

## 2018-11-15 RX ORDER — SIMETHICONE 80 MG/1
80 TABLET, CHEWABLE ORAL EVERY 6 HOURS
Qty: 0 | Refills: 0 | Status: DISCONTINUED | OUTPATIENT
Start: 2018-11-15 | End: 2018-11-17

## 2018-11-15 RX ORDER — IBUPROFEN 200 MG
600 TABLET ORAL EVERY 6 HOURS
Qty: 0 | Refills: 0 | Status: DISCONTINUED | OUTPATIENT
Start: 2018-11-15 | End: 2018-11-17

## 2018-11-15 RX ORDER — DOCUSATE SODIUM 100 MG
100 CAPSULE ORAL
Qty: 0 | Refills: 0 | Status: DISCONTINUED | OUTPATIENT
Start: 2018-11-15 | End: 2018-11-17

## 2018-11-15 RX ORDER — DIBUCAINE 1 %
1 OINTMENT (GRAM) RECTAL EVERY 4 HOURS
Qty: 0 | Refills: 0 | Status: DISCONTINUED | OUTPATIENT
Start: 2018-11-15 | End: 2018-11-17

## 2018-11-15 RX ADMIN — Medication 100 MILLIGRAM(S): at 22:19

## 2018-11-15 RX ADMIN — Medication 600 MILLIGRAM(S): at 08:35

## 2018-11-15 RX ADMIN — Medication 600 MILLIGRAM(S): at 23:15

## 2018-11-15 RX ADMIN — Medication 1 TABLET(S): at 12:20

## 2018-11-15 RX ADMIN — Medication 600 MILLIGRAM(S): at 14:18

## 2018-11-15 RX ADMIN — Medication 600 MILLIGRAM(S): at 22:19

## 2018-11-15 RX ADMIN — Medication 600 MILLIGRAM(S): at 09:30

## 2018-11-15 RX ADMIN — Medication 600 MILLIGRAM(S): at 15:15

## 2018-11-15 NOTE — LACTATION INITIAL EVALUATION - REQUESTED BY
lactation rounds, initial visit. Multiparous mother delivered @ 404/7 weeks gestation via vaginal delivery. The mother reported breast feeding her first child for 2 months and supplementing with formula. She said she breast fed her second child for 4 months and supplemented with formula. The mother is breast feeding this  and supplementing with formula as per her choice.  The  was ~ 14 hours old when I consulted @ the bedside. The mother reported breast feeding the  shortly before I arrived @ her bedside. The  is sleepy. The  is DTV and has had 2 stools documented since birth. Breast feeding guidelines, positioning the  to the breast, latching strategies, early feeding cues, hand expression, a triple feeding plan of care [ for the appropriate amount to feed the  ], paced bottle feeding and 's output requirements reviewed with the mother. Written literature was given to the mother on the above. S2S was encouraged. All questions were answered and the mother verbalized understanding. I encouraged the mother to ask for lactation assistance as needed. lactation rounds, initial visit. Multiparous mother delivered @ 404/7 weeks gestation via vaginal delivery. The mother reported breast feeding her first child for 2 months and supplementing with formula. She said she breast fed her second child for 4 months and supplemented with formula. The mother is breast feeding this  and supplementing with formula as per her choice. I explained to the mother, that we will support her decision to supplement with formula. I did explain the supply and demand of milk production, the possibility of feeding confusion, the differences in milk flow. The  was ~ 14 hours old when I consulted @ the bedside. The mother reported breast feeding the  shortly before I arrived @ her bedside. The  is sleepy. The  is DTV and has had 2 stools documented since birth. Breast feeding guidelines, positioning the  to the breast, latching strategies, early feeding cues, hand expression, a triple feeding plan of care [ for the appropriate amount to feed the  ], paced bottle feeding and 's output requirements reviewed with the mother. Written literature was given to the mother on the above. S2S was encouraged. All questions were answered and the mother verbalized understanding. I encouraged the mother to ask for lactation assistance as needed.

## 2018-11-16 RX ADMIN — Medication 600 MILLIGRAM(S): at 12:22

## 2018-11-16 RX ADMIN — Medication 600 MILLIGRAM(S): at 05:36

## 2018-11-16 RX ADMIN — Medication 600 MILLIGRAM(S): at 18:02

## 2018-11-16 RX ADMIN — Medication 600 MILLIGRAM(S): at 19:00

## 2018-11-16 RX ADMIN — Medication 600 MILLIGRAM(S): at 11:48

## 2018-11-16 RX ADMIN — Medication 600 MILLIGRAM(S): at 06:30

## 2018-11-16 RX ADMIN — Medication 1 TABLET(S): at 11:47

## 2018-11-16 NOTE — PROGRESS NOTE ADULT - ASSESSMENT
A/P yo 33y  s/p , PP# 1, stable  1. Pain: OPM  2. GI: Reg  3. :  Voiding  4. DVT prophylaxis: SCDs, ambulation  5. Dispo: PP#2

## 2018-11-17 ENCOUNTER — TRANSCRIPTION ENCOUNTER (OUTPATIENT)
Age: 33
End: 2018-11-17

## 2018-11-17 VITALS
SYSTOLIC BLOOD PRESSURE: 125 MMHG | TEMPERATURE: 98 F | DIASTOLIC BLOOD PRESSURE: 71 MMHG | OXYGEN SATURATION: 98 % | RESPIRATION RATE: 18 BRPM | HEART RATE: 69 BPM

## 2018-11-17 PROCEDURE — 86901 BLOOD TYPING SEROLOGIC RH(D): CPT

## 2018-11-17 PROCEDURE — 86900 BLOOD TYPING SEROLOGIC ABO: CPT

## 2018-11-17 PROCEDURE — 85027 COMPLETE CBC AUTOMATED: CPT

## 2018-11-17 PROCEDURE — 86780 TREPONEMA PALLIDUM: CPT

## 2018-11-17 PROCEDURE — 90686 IIV4 VACC NO PRSV 0.5 ML IM: CPT

## 2018-11-17 PROCEDURE — 86850 RBC ANTIBODY SCREEN: CPT

## 2018-11-17 PROCEDURE — 88307 TISSUE EXAM BY PATHOLOGIST: CPT

## 2018-11-17 PROCEDURE — 99214 OFFICE O/P EST MOD 30 MIN: CPT

## 2018-11-17 RX ORDER — ASCORBIC ACID 60 MG
0 TABLET,CHEWABLE ORAL
Qty: 0 | Refills: 0 | COMMUNITY

## 2018-11-17 RX ORDER — ACETAMINOPHEN 500 MG
2 TABLET ORAL
Qty: 0 | Refills: 0 | DISCHARGE
Start: 2018-11-17

## 2018-11-17 RX ORDER — IBUPROFEN 200 MG
1 TABLET ORAL
Qty: 0 | Refills: 0 | DISCHARGE
Start: 2018-11-17

## 2018-11-17 RX ORDER — BENZOYL PEROXIDE MICRONIZED 5.8 %
1 TOWELETTE (EA) TOPICAL
Qty: 0 | Refills: 0 | COMMUNITY

## 2018-11-17 RX ADMIN — Medication 600 MILLIGRAM(S): at 12:29

## 2018-11-17 RX ADMIN — Medication 1 TABLET(S): at 12:29

## 2018-11-17 RX ADMIN — Medication 600 MILLIGRAM(S): at 13:15

## 2018-11-17 RX ADMIN — Medication 600 MILLIGRAM(S): at 00:36

## 2018-11-17 RX ADMIN — SODIUM CHLORIDE 3 MILLILITER(S): 9 INJECTION INTRAMUSCULAR; INTRAVENOUS; SUBCUTANEOUS at 00:07

## 2018-11-17 RX ADMIN — INFLUENZA VIRUS VACCINE 0.5 MILLILITER(S): 15; 15; 15; 15 SUSPENSION INTRAMUSCULAR at 00:07

## 2018-11-17 RX ADMIN — Medication 600 MILLIGRAM(S): at 06:27

## 2018-11-17 RX ADMIN — Medication 600 MILLIGRAM(S): at 05:57

## 2018-11-17 RX ADMIN — Medication 600 MILLIGRAM(S): at 00:06

## 2018-11-17 NOTE — DISCHARGE NOTE OB - PLAN OF CARE
postpartum care 33y female s/p vaginal delivery, postpartum day 2, meeting all postpartum milestones. Pelvic rest until cleared. Follow-up with obstetrician in 6 weeks.

## 2018-11-17 NOTE — DISCHARGE NOTE OB - MEDICATION SUMMARY - MEDICATIONS TO TAKE
I will START or STAY ON the medications listed below when I get home from the hospital:    acetaminophen 325 mg oral tablet  -- 2 tab(s) by mouth every 6 hours, As needed, Temp greater or equal to 38.5C (101.3F), Mild Pain (1 - 3)  -- Indication: For postpartum pain    ibuprofen 600 mg oral tablet  -- 1 tab(s) by mouth every 6 hours, As needed, Moderate Pain (4 - 6)  -- Indication: For postpartum pain

## 2018-11-17 NOTE — DISCHARGE NOTE OB - CARE PLAN
Principal Discharge DX:	Postpartum state  Goal:	postpartum care  Assessment and plan of treatment:	33y female s/p vaginal delivery, postpartum day 2, meeting all postpartum milestones. Pelvic rest until cleared. Follow-up with obstetrician in 6 weeks.

## 2018-11-17 NOTE — DISCHARGE NOTE OB - PATIENT PORTAL LINK FT
You can access the [facility] Patient Portal, offered by [facility], by registering with the following website: [email or web address]

## 2018-11-17 NOTE — PROGRESS NOTE ADULT - SUBJECTIVE AND OBJECTIVE BOX
Patient evaluated at bedside.     She reports pain is well controlled with motrin.     She has been ambulating without assistance, voiding spontaneously, and is breastfeeding.    She denies HA, dizziness, chest pain, palpitations, shortness of breath, n/v, heavy vaginal bleeding or perineal discomfort.    Physical Exam:  Vital Signs Last 24 Hrs  T(C): 36.5 (17 Nov 2018 06:00), Max: 36.9 (16 Nov 2018 18:14)  T(F): 97.7 (17 Nov 2018 06:00), Max: 98.4 (16 Nov 2018 18:14)  HR: 68 (17 Nov 2018 06:00) (68 - 71)  BP: 91/58 (17 Nov 2018 06:00) (91/58 - 102/65)  BP(mean): --  RR: 17 (17 Nov 2018 06:00) (17 - 18)  SpO2: 97% (17 Nov 2018 06:00) (97% - 99%)    GA: NAD, A+0 x 3  Abd: + BS, soft, nontender, nondistended, no rebound or guarding, uterus firm at midline  : lochia WNL  Extremities: no edema or calf tenderness

## 2018-11-17 NOTE — DISCHARGE NOTE OB - CARE PROVIDER_API CALL
Bakari Stanley), Obstetrics and Gynecology  [address] [place] [address]  Phone: [phone]  Fax: [phone]

## 2018-11-17 NOTE — DISCHARGE NOTE OB - MATERIALS PROVIDED
[facility] [place] Screening Program/[place]  Immunization Record/Guide to Postpartum Care/[facility] Hearing Screen Program/Birth Certificate Instructions/Shaken Baby Prevention Handout/Breastfeeding Guide and Packet/Breastfeeding Log/Back To Sleep Handout/Tdap Vaccination (VIS Pub Date: 2012)

## 2018-11-17 NOTE — PROVIDER CONTACT NOTE (OTHER) - ASSESSMENT
Pt states "I will talk with the  at home when they call me." Pt states she has no thoughts of harm towards herself, others or infant. Pt states she has family support at home.

## 2018-11-20 LAB — SURGICAL PATHOLOGY STUDY: SIGNIFICANT CHANGE UP

## 2018-11-21 DIAGNOSIS — Z91.013 ALLERGY TO SEAFOOD: ICD-10-CM

## 2018-11-21 DIAGNOSIS — Z67.11 TYPE A BLOOD, RH NEGATIVE: ICD-10-CM

## 2018-11-21 DIAGNOSIS — O26.893 OTHER SPECIFIED PREGNANCY RELATED CONDITIONS, THIRD TRIMESTER: ICD-10-CM

## 2018-11-21 DIAGNOSIS — Z34.93 ENCOUNTER FOR SUPERVISION OF NORMAL PREGNANCY, UNSPECIFIED, THIRD TRIMESTER: ICD-10-CM

## 2018-11-21 DIAGNOSIS — Z98.890 OTHER SPECIFIED POSTPROCEDURAL STATES: ICD-10-CM

## 2018-11-21 DIAGNOSIS — Z28.09 IMMUNIZATION NOT CARRIED OUT BECAUSE OF OTHER CONTRAINDICATION: ICD-10-CM

## 2018-11-21 DIAGNOSIS — Z79.899 OTHER LONG TERM (CURRENT) DRUG THERAPY: ICD-10-CM

## 2018-11-21 DIAGNOSIS — Z88.0 ALLERGY STATUS TO PENICILLIN: ICD-10-CM

## 2018-11-21 DIAGNOSIS — Z3A.40 40 WEEKS GESTATION OF PREGNANCY: ICD-10-CM

## 2018-11-21 DIAGNOSIS — Z23 ENCOUNTER FOR IMMUNIZATION: ICD-10-CM

## 2018-11-21 DIAGNOSIS — Z28.04 IMMUNIZATION NOT CARRIED OUT BECAUSE OF PATIENT ALLERGY TO VACCINE OR COMPONENT: ICD-10-CM

## 2019-07-16 ENCOUNTER — APPOINTMENT (OUTPATIENT)
Dept: PLASTIC SURGERY | Facility: CLINIC | Age: 34
End: 2019-07-16
Payer: MEDICAID

## 2019-07-16 VITALS
DIASTOLIC BLOOD PRESSURE: 64 MMHG | WEIGHT: 165 LBS | BODY MASS INDEX: 31.15 KG/M2 | SYSTOLIC BLOOD PRESSURE: 103 MMHG | HEIGHT: 61 IN | HEART RATE: 64 BPM

## 2019-07-16 DIAGNOSIS — L92.9 GRANULOMATOUS DISORDER OF THE SKIN AND SUBCUTANEOUS TISSUE, UNSPECIFIED: ICD-10-CM

## 2019-07-16 DIAGNOSIS — D49.2 NEOPLASM OF UNSPECIFIED BEHAVIOR OF BONE, SOFT TISSUE, AND SKIN: ICD-10-CM

## 2019-07-16 DIAGNOSIS — D23.9 OTHER BENIGN NEOPLASM OF SKIN, UNSPECIFIED: ICD-10-CM

## 2019-07-16 PROCEDURE — 99203 OFFICE O/P NEW LOW 30 MIN: CPT

## 2019-07-17 PROBLEM — L92.9 GRANULOMA OF SKIN: Status: ACTIVE | Noted: 2019-07-17

## 2019-07-17 PROBLEM — D23.9 BENIGN NEOPLASM OF SKIN: Status: ACTIVE | Noted: 2019-07-17

## 2019-07-17 NOTE — HISTORY OF PRESENT ILLNESS
[FreeTextEntry1] : 35 y/o F (+ tobacco hx-- 2-3 days/ 10 yrs) is referred from Dr. Degroot, dermatology- c.o. 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 She notes history of steroid injections for treatment--several times--she did not notice any improvement. Currently she c.o. itching and discomfort--including pain when she has headaches.

## 2019-07-17 NOTE — PHYSICAL EXAM
[de-identified] : 1cm x .8mm soft, mobile, hyperpigmented, pedunculated, lesion on the posterior surface of the helical rim of the right ear, anteriorly there is a piercing site. no fluctuance or purulence

## 2019-07-17 NOTE — ASSESSMENT
[FreeTextEntry1] : Reviewed with the patient the risks benefits and alternatives of excision of this subcutaneous mass. I explained that we can perform this under local anesthesia in the office and that we will send a specimen to pathology for evaluation the risks include but are not limited to the risk of bleeding, infection, delayed wound healing, suboptimal scarring, recurrence, and need for revision surgery. \par \par Although this appears to be benign lesion until it is sent for pathology we cannot confirm this. I reviewed scar management treatments including sun avoidance, sunblock, moisturizer, and massage. We will attempt to utilize the natural creases and lines of relaxed and and wrinkles to hide the scar. It will take months and even a few years for the scar to completely mature. All scar start of red, raised and thickened and usually softened, flattened, and leg with time. There is a was a risk of hypertrophic scarring or keloid formation.\par \par I explained the possibility that if this is a keloid there is a 50% chance of recurrence and it can be worse then it is now. \par \par The patient stated that they understood the risks of the procedure we can schedule this at her convenience.\par \par

## 2019-09-12 ENCOUNTER — EMERGENCY (EMERGENCY)
Facility: HOSPITAL | Age: 34
LOS: 1 days | Discharge: HOME | End: 2019-09-12
Attending: EMERGENCY MEDICINE | Admitting: EMERGENCY MEDICINE
Payer: MEDICAID

## 2019-09-12 VITALS
DIASTOLIC BLOOD PRESSURE: 75 MMHG | RESPIRATION RATE: 16 BRPM | HEART RATE: 74 BPM | WEIGHT: 164.91 LBS | SYSTOLIC BLOOD PRESSURE: 118 MMHG | HEIGHT: 61 IN | TEMPERATURE: 98 F | OXYGEN SATURATION: 98 %

## 2019-09-12 VITALS
SYSTOLIC BLOOD PRESSURE: 105 MMHG | HEART RATE: 85 BPM | DIASTOLIC BLOOD PRESSURE: 65 MMHG | OXYGEN SATURATION: 99 % | RESPIRATION RATE: 18 BRPM

## 2019-09-12 DIAGNOSIS — O99.340 OTHER MENTAL DISORDERS COMPLICATING PREGNANCY, UNSPECIFIED TRIMESTER: ICD-10-CM

## 2019-09-12 DIAGNOSIS — F32.9 MAJOR DEPRESSIVE DISORDER, SINGLE EPISODE, UNSPECIFIED: ICD-10-CM

## 2019-09-12 DIAGNOSIS — R69 ILLNESS, UNSPECIFIED: ICD-10-CM

## 2019-09-12 DIAGNOSIS — F32.1 MAJOR DEPRESSIVE DISORDER, SINGLE EPISODE, MODERATE: ICD-10-CM

## 2019-09-12 LAB
ANION GAP SERPL CALC-SCNC: 10 MMOL/L — SIGNIFICANT CHANGE UP (ref 5–17)
APAP SERPL-MCNC: <5 UG/ML — LOW (ref 10–30)
APPEARANCE UR: CLEAR — SIGNIFICANT CHANGE UP
BASOPHILS # BLD AUTO: 0.03 K/UL — SIGNIFICANT CHANGE UP (ref 0–0.2)
BASOPHILS NFR BLD AUTO: 0.3 % — SIGNIFICANT CHANGE UP (ref 0–2)
BILIRUB UR-MCNC: NEGATIVE — SIGNIFICANT CHANGE UP
BUN SERPL-MCNC: 9 MG/DL — SIGNIFICANT CHANGE UP (ref 7–23)
CALCIUM SERPL-MCNC: 9.3 MG/DL — SIGNIFICANT CHANGE UP (ref 8.4–10.5)
CHLORIDE SERPL-SCNC: 102 MMOL/L — SIGNIFICANT CHANGE UP (ref 96–108)
CO2 SERPL-SCNC: 24 MMOL/L — SIGNIFICANT CHANGE UP (ref 22–31)
COLOR SPEC: YELLOW — SIGNIFICANT CHANGE UP
CREAT SERPL-MCNC: 0.48 MG/DL — LOW (ref 0.5–1.3)
DIFF PNL FLD: NEGATIVE — SIGNIFICANT CHANGE UP
EOSINOPHIL # BLD AUTO: 0.07 K/UL — SIGNIFICANT CHANGE UP (ref 0–0.5)
EOSINOPHIL NFR BLD AUTO: 0.7 % — SIGNIFICANT CHANGE UP (ref 0–6)
ETHANOL SERPL-MCNC: <10 MG/DL — SIGNIFICANT CHANGE UP (ref 0–10)
GLUCOSE SERPL-MCNC: 83 MG/DL — SIGNIFICANT CHANGE UP (ref 70–99)
GLUCOSE UR QL: NEGATIVE — SIGNIFICANT CHANGE UP
HCG SERPL-ACNC: HIGH MIU/ML
HCT VFR BLD CALC: 36.7 % — SIGNIFICANT CHANGE UP (ref 34.5–45)
HGB BLD-MCNC: 12.3 G/DL — SIGNIFICANT CHANGE UP (ref 11.5–15.5)
IMM GRANULOCYTES NFR BLD AUTO: 0.4 % — SIGNIFICANT CHANGE UP (ref 0–1.5)
KETONES UR-MCNC: 15 MG/DL
LEUKOCYTE ESTERASE UR-ACNC: ABNORMAL
LYMPHOCYTES # BLD AUTO: 2.96 K/UL — SIGNIFICANT CHANGE UP (ref 1–3.3)
LYMPHOCYTES # BLD AUTO: 27.9 % — SIGNIFICANT CHANGE UP (ref 13–44)
MCHC RBC-ENTMCNC: 30.8 PG — SIGNIFICANT CHANGE UP (ref 27–34)
MCHC RBC-ENTMCNC: 33.5 GM/DL — SIGNIFICANT CHANGE UP (ref 32–36)
MCV RBC AUTO: 92 FL — SIGNIFICANT CHANGE UP (ref 80–100)
MONOCYTES # BLD AUTO: 0.79 K/UL — SIGNIFICANT CHANGE UP (ref 0–0.9)
MONOCYTES NFR BLD AUTO: 7.5 % — SIGNIFICANT CHANGE UP (ref 2–14)
NEUTROPHILS # BLD AUTO: 6.71 K/UL — SIGNIFICANT CHANGE UP (ref 1.8–7.4)
NEUTROPHILS NFR BLD AUTO: 63.2 % — SIGNIFICANT CHANGE UP (ref 43–77)
NITRITE UR-MCNC: NEGATIVE — SIGNIFICANT CHANGE UP
NRBC # BLD: 0 /100 WBCS — SIGNIFICANT CHANGE UP (ref 0–0)
PCP SPEC-MCNC: SIGNIFICANT CHANGE UP
PH UR: 6.5 — SIGNIFICANT CHANGE UP (ref 5–8)
PLATELET # BLD AUTO: 246 K/UL — SIGNIFICANT CHANGE UP (ref 150–400)
POTASSIUM SERPL-MCNC: 3.7 MMOL/L — SIGNIFICANT CHANGE UP (ref 3.5–5.3)
POTASSIUM SERPL-SCNC: 3.7 MMOL/L — SIGNIFICANT CHANGE UP (ref 3.5–5.3)
PROT UR-MCNC: NEGATIVE MG/DL — SIGNIFICANT CHANGE UP
RBC # BLD: 3.99 M/UL — SIGNIFICANT CHANGE UP (ref 3.8–5.2)
RBC # FLD: 12.8 % — SIGNIFICANT CHANGE UP (ref 10.3–14.5)
SALICYLATES SERPL-MCNC: <0.3 MG/DL — LOW (ref 2.8–20)
SODIUM SERPL-SCNC: 136 MMOL/L — SIGNIFICANT CHANGE UP (ref 135–145)
SP GR SPEC: 1.01 — SIGNIFICANT CHANGE UP (ref 1–1.03)
UROBILINOGEN FLD QL: 0.2 E.U./DL — SIGNIFICANT CHANGE UP
WBC # BLD: 10.6 K/UL — HIGH (ref 3.8–10.5)
WBC # FLD AUTO: 10.6 K/UL — HIGH (ref 3.8–10.5)

## 2019-09-12 PROCEDURE — 36415 COLL VENOUS BLD VENIPUNCTURE: CPT

## 2019-09-12 PROCEDURE — 80048 BASIC METABOLIC PNL TOTAL CA: CPT

## 2019-09-12 PROCEDURE — 93010 ELECTROCARDIOGRAM REPORT: CPT

## 2019-09-12 PROCEDURE — 99284 EMERGENCY DEPT VISIT MOD MDM: CPT | Mod: 25

## 2019-09-12 PROCEDURE — 85025 COMPLETE CBC W/AUTO DIFF WBC: CPT

## 2019-09-12 PROCEDURE — 84702 CHORIONIC GONADOTROPIN TEST: CPT

## 2019-09-12 PROCEDURE — 80307 DRUG TEST PRSMV CHEM ANLYZR: CPT

## 2019-09-12 PROCEDURE — 93005 ELECTROCARDIOGRAM TRACING: CPT

## 2019-09-12 PROCEDURE — 99284 EMERGENCY DEPT VISIT MOD MDM: CPT

## 2019-09-12 PROCEDURE — 90792 PSYCH DIAG EVAL W/MED SRVCS: CPT

## 2019-09-12 PROCEDURE — 81001 URINALYSIS AUTO W/SCOPE: CPT

## 2019-09-12 NOTE — ED BEHAVIORAL HEALTH ASSESSMENT NOTE - FAMILY DETAILS
Lives with her 3 children, ages 12, 7 and 10 months. Infant was born at [facility]. Lives with her 3 children, ages 12, 7 and 10 months. Infant was born at [facility]. (Currently older children are with pt's cousin.)

## 2019-09-12 NOTE — ED ADULT TRIAGE NOTE - CHIEF COMPLAINT QUOTE
pt states " I am feeling depressed & I am not happy." tearful in triage. pt has hx of depression, off medication for a long time. pt is 9 months post partum.

## 2019-09-12 NOTE — ED BEHAVIORAL HEALTH ASSESSMENT NOTE - SAFETY PLAN DETAILS
Pt advised to call 911/return to this or any ED prn worsening depression, especially if accompanied by thoughts of harm to herself or her children.

## 2019-09-12 NOTE — ED PROVIDER NOTE - CARE PLAN
Principal Discharge DX:	Depressed mood Principal Discharge DX:	Depressed mood  Secondary Diagnosis:	Pregnancy, unspecified gestational age

## 2019-09-12 NOTE — ED ADULT NURSE NOTE - OBJECTIVE STATEMENT
Pt is a 34 y female, arrived to ED via walk in, AAOx3, in NAD, VSS, complaining of SI, depression and anxiety, worsening since birth of . Complaining of chest tightness and Nausea related to depression/anxiety. Patient denies any specific plan, vomiting, or any other complaints at this time. Labs drawn and sent. EKG done.

## 2019-09-12 NOTE — ED BEHAVIORAL HEALTH ASSESSMENT NOTE - HPI (INCLUDE ILLNESS QUALITY, SEVERITY, DURATION, TIMING, CONTEXT, MODIFYING FACTORS, ASSOCIATED SIGNS AND SYMPTOMS)
HPI from ED Attending: "34 year old female with history of anxiety and depression presents to ED with concern for feeling depressed and overwhelmed lately.  Patient has an infant with her, stating "it's a lot."  She denies suicide attempts or plan to harm herself, though states she does think about suicide sometimes.  She states she does not feel as though she would harm herself.  She denies associated homicidal ideation, fever, chills, chest pain, shortness of breath, abdominal pain, nausea, emesis, changes to bowel movements or any additional acute complaints or concerns at this time."    Pt seen, observed livingly tending to her baby.    Endorses above c/o.    Reports that since the birth of her 98-vzjef-wqe son, Rhys, she's been feeling increasingly overwhelmed with responsibilities, including taking care of baby and her two older kids, ages 12 and 7, finding help at home so she can return to work (works parttime at [employer] and just returned 4 days ago after an extended leave). Currently lives alone with children in [facility]. States she sought out outpt help via her union, last session in June.    Pt endorses feelings of inadequacy, inability to do enough, states she is prone to spontaneous crying. Denies inability to care for her baby or other kids, denies suicidal or homicidal ideation/intent/plan. Recently returned to her parttime job at [employer], after extended maternity leave. HPI from ED Attending: "34 year old female with history of anxiety and depression presents to ED with concern for feeling depressed and overwhelmed lately.  Patient has an infant with her, stating "it's a lot."  She denies suicide attempts or plan to harm herself, though states she does think about suicide sometimes.  She states she does not feel as though she would harm herself.  She denies associated homicidal ideation, fever, chills, chest pain, shortness of breath, abdominal pain, nausea, emesis, changes to bowel movements or any additional acute complaints or concerns at this time."    Pt seen, observed livingly tending to her baby.    Endorses above c/o.    Reports that since the birth of her 47-qjuzo-ejq son, Rhys, she's been feeling increasingly overwhelmed with responsibilities, including taking care of baby and her two older kids, ages 12 and 7, finding help at home so she can return to work (works parttime at [employer] and just returned 4 days ago after an extended leave). Currently lives alone with children in [facility]. States she sought out outpt help via her union, last session in June.    Pt endorses feelings of inadequacy, inability to do enough, states she is prone to spontaneous crying. Denies inability to care for her baby or other kids, denies suicidal or homicidal ideation/intent/plan. Recently returned to her parttime job at [employer], after extended maternity leave. Pt acknowledges intermittent EtOH intoxication, most recent ~ 9 months ago, though currently reports she has not engaged in even social drinking x 2 months. Pt denies tobacco use. Pt denies other substance abuse. Today's  u tox was + for THC, though pt reports she only ???"vapes."    During interview, pt observed lovingly tending to her baby son, Rhys, who appears well nourished, well cared for and delightfully engageable with [facility] personnel including this M.D.

## 2019-09-12 NOTE — ED PROVIDER NOTE - OBJECTIVE STATEMENT
34 year old female with history of anxiety and depression presents to ED with concern for feeling depressed and overwhelmed lately.  Patient has an infant with her, stating "it's a lot."  She denies suicide attempts or plan to harm herself, though states she does think about suicide sometimes.  She states she does not feel as though she would harm herself.  She denies associated homicidal ideation, fever, chills, chest pain, shortness of breath, abdominal pain, nausea, emesis, changes to bowel movements or any additional acute complaints or concerns at this time.
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
## 2019-09-12 NOTE — ED BEHAVIORAL HEALTH ASSESSMENT NOTE - REFERRAL / APPOINTMENT DETAILS
Pt needs to be referred for follow up at non-[facility] facilities due to her insurance. Provided with list of referrals and my business card.

## 2019-09-12 NOTE — ED BEHAVIORAL HEALTH ASSESSMENT NOTE - SUMMARY
34 year old female with history of anxiety and depression presents to ED with concern for feeling depressed and increasingly overwhelmed, in context of multiple psychosocial stressors including caring for 3 children, ages 12, 7 and 10 months old. 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  Pt denies suicidal or homicidal ideation/intent/plan (and denies history of suicidality or homicidality). She is observed lovingly taking care of her 10-month old son during her ED stay.  Pt motivated for outpt psychiatric follow-up.   Pt provided with multiple potential outpt referrals by this M.D. and S.W. Unfortunately, pt's current insurance is not accepted at [facility] outpt clinic or PMAD programs.

## 2019-09-12 NOTE — ED PROVIDER NOTE - PATIENT PORTAL LINK FT
You can access the FollowMyHealth Patient Portal offered by [facility] by registering at the following website: [email or web address]. By joining [facility] FollowMyHealth portal, you will also be able to view your health information using other applications (apps) compatible with our system.

## 2019-09-12 NOTE — ED BEHAVIORAL HEALTH ASSESSMENT NOTE - RISK ASSESSMENT
Pt with no hx of suicidal ideation/intent/plan/attempts and denies any thoughts of harm to self or others, citing her children as a strong protective factor.

## 2019-09-12 NOTE — ED BEHAVIORAL HEALTH ASSESSMENT NOTE - DESCRIPTION
Calm; seated with infant N/A As above. Has children with 2 fathers (older kids with one man, baby with another). Not involved with either father.

## 2019-09-12 NOTE — ED BEHAVIORAL HEALTH ASSESSMENT NOTE - DESCRIPTION (FIRST USE, LAST USE, QUANTITY, FREQUENCY, DURATION)
Intermittent heavy intake, last about 9 months ago. Stopped even drinking socially 2 months ago. Pt denied use, but her u tox today is + for THC.

## 2019-09-12 NOTE — ED BEHAVIORAL HEALTH ASSESSMENT NOTE - SUICIDE PROTECTIVE FACTORS
Future oriented/Identifies reasons for living/Responsibility to family and others/Engaged in work or school

## 2019-10-10 ENCOUNTER — RESULT REVIEW (OUTPATIENT)
Age: 34
End: 2019-10-10

## 2019-10-10 ENCOUNTER — APPOINTMENT (OUTPATIENT)
Dept: PLASTIC SURGERY | Facility: CLINIC | Age: 34
End: 2019-10-10
Payer: MEDICAID

## 2019-10-10 DIAGNOSIS — L91.0 HYPERTROPHIC SCAR: ICD-10-CM

## 2019-10-10 PROCEDURE — 14060 TIS TRNFR E/N/E/L 10 SQ CM/<: CPT

## 2019-10-10 PROCEDURE — 11442 EXC FACE-MM B9+MARG 1.1-2 CM: CPT | Mod: 59

## 2019-10-11 ENCOUNTER — OUTPATIENT (OUTPATIENT)
Dept: OUTPATIENT SERVICES | Facility: HOSPITAL | Age: 34
LOS: 1 days | End: 2019-10-11
Payer: MEDICAID

## 2019-10-11 DIAGNOSIS — L91.0 HYPERTROPHIC SCAR: ICD-10-CM

## 2019-10-11 DIAGNOSIS — D49.2 NEOPLASM OF UNSPECIFIED BEHAVIOR OF BONE, SOFT TISSUE, AND SKIN: ICD-10-CM

## 2019-10-11 PROCEDURE — 88305 TISSUE EXAM BY PATHOLOGIST: CPT

## 2019-10-11 NOTE — PROCEDURE
[Nl] : None [FreeTextEntry1] : right ear keloid [FreeTextEntry2] : local tissue rearrangment ear <10cm & excision of right ear keloid 1.5cm x 1cm [FreeTextEntry6] : after prepping and draping in the standard fashion i excised the  keloid on the posterior surface of the helical rim full thickness down to the helical cartilage but not including the perichondrium and then mobilized the surrounding skin by undermining it with the dissecting scissors and then made a small backcut medially along the lower portion of the defect to rotate the medial skin into and fill the defect the total surface area was 2.0cm x 1.5cm I then  advanced the skin and closed in a layered fashion with 4-0 monocryl buried and 5-0 nylon running suture. Sterile dressing and bacitracin was applied

## 2019-10-11 NOTE — PHYSICAL EXAM
[de-identified] : 1cm x .8mm soft, mobile, hyperpigmented, pedunculated, lesion on the posterior surface of the helical rim of the right ear, anteriorly there is a piercing site. no fluctuance or purulence

## 2019-10-15 LAB — SURGICAL PATHOLOGY STUDY: SIGNIFICANT CHANGE UP

## 2019-10-17 ENCOUNTER — APPOINTMENT (OUTPATIENT)
Dept: PLASTIC SURGERY | Facility: CLINIC | Age: 34
End: 2019-10-17

## 2019-10-21 ENCOUNTER — APPOINTMENT (OUTPATIENT)
Dept: PLASTIC SURGERY | Facility: CLINIC | Age: 34
End: 2019-10-21

## 2019-12-02 NOTE — ED PROVIDER NOTE - NSFOLLOWUPINSTRUCTIONS_ED_ALL_ED_FT
[facility] called stating that the script needs to be changed to either MRI brain without contrast or MRI brain with and without contrast   Spoke with Kayley Curran PA-C and decision was made to change script to with and without contrast   Called MRI back to inform them and they were appreciative 
Please follow up with your primary physician in 1-2 days for re evaluation.  Please return to ER immediately should your symptoms worsen or if you have any concern prior to this recommended follow up.    Depression    WHAT YOU NEED TO KNOW:    Depression is a medical condition that causes feelings of sadness or hopelessness that do not go away. Depression may cause you to lose interest in things you used to enjoy. These feelings may interfere with your daily life.    DISCHARGE INSTRUCTIONS:    Call your local emergency number (911 in the ) if:     You think about harming yourself or someone else.      You have done something on purpose to hurt yourself.    Call your therapist or doctor if:     Your symptoms do not improve.      You cannot make it to your next appointment.       You have new symptoms.      You have questions or concerns about your condition or care.    The following resources are available at any time to help you, if needed:     National Suicide Prevention Lifeline: 1-330.258.1910 (9-094-587-TALK)      Suicide Hotline: 1-120.563.1829 (2-893-ULFMWWT)      For a list of international numbers: https://save.org/find-help/international-resources/    Medicines:     Antidepressants may be given to improve or balance your mood. You may need to take this medicine for several weeks before you begin to feel better.      Take your medicine as directed. Contact your healthcare provider if you think your medicine is not helping or if you have side effects. Tell him of her if you are allergic to any medicine. Keep a list of the medicines, vitamins, and herbs you take. Include the amounts, and when and why you take them. Bring the list or the pill bottles to follow-up visits. Carry your medicine list with you in case of an emergency.    Therapy is often used together with medicine to relieve depression. Therapy is a way for you to talk about your feelings and anything that may be causing depression. Therapy can be done alone or in a group. It may also be done with family members or a significant other.    Self-care:     Get regular physical activity. Try to be active for 30 minutes, 3 to 5 days a week. Physical activity can help relieve depression. Work with your healthcare provider to develop a plan that you enjoy. It may help to ask someone to be active with you.      Create a regular sleep schedule. A routine can help you relax before bed. Listen to music, read, or do yoga. Try to go to bed and wake up at the same time every day. Sleep is important for emotional health.      Eat a variety of healthy foods. Healthy foods include fruits, vegetables, whole-grain breads, low-fat dairy products, lean meats, fish, and cooked beans. A healthy meal plan is low in fat, salt, and added sugar.      Do not drink alcohol or use drugs. Alcohol and drugs can make depression worse. Talk to your therapist or doctor if you need help quitting.    Follow up with your healthcare provider as directed: Your healthcare provider will monitor your progress at follow-up visits. He or she will also monitor your medicine if you take antidepressants. Your healthcare provider will ask if the medicine is helping. Tell him or her about any side effects or problems you may have with your medicine. The type or amount of medicine may need to be changed. Write down your questions so you remember to ask them during your visits.       © Copyright [employer] 2019 All illustrations and images included in CareNotes are the copyrighted property of [employer] or [employer].      back to top                      © Copyright [employer] 2019

## 2021-01-29 NOTE — DISCHARGE NOTE OB - NS AS DC FU INST LIST INST
Abdomen , soft, nontender, nondistended , no guarding or rigidity , no masses palpable , normal bowel sounds , Liver and Spleen , no hepatomegaly present , no hepatosplenomegaly , liver nontender , spleen not palpable no

## 2021-05-01 ENCOUNTER — OUTPATIENT (OUTPATIENT)
Dept: OUTPATIENT SERVICES | Facility: HOSPITAL | Age: 36
LOS: 1 days | End: 2021-05-01
Payer: MEDICAID

## 2021-05-02 ENCOUNTER — EMERGENCY (EMERGENCY)
Facility: HOSPITAL | Age: 36
LOS: 1 days | Discharge: HOME | End: 2021-05-02
Admitting: EMERGENCY MEDICINE
Payer: COMMERCIAL

## 2021-05-02 VITALS
HEART RATE: 73 BPM | RESPIRATION RATE: 18 BRPM | DIASTOLIC BLOOD PRESSURE: 61 MMHG | TEMPERATURE: 99 F | SYSTOLIC BLOOD PRESSURE: 95 MMHG | HEIGHT: 61 IN | WEIGHT: 154.98 LBS | OXYGEN SATURATION: 97 %

## 2021-05-02 DIAGNOSIS — Z91.013 ALLERGY TO SEAFOOD: ICD-10-CM

## 2021-05-02 DIAGNOSIS — U07.1 COVID-19: ICD-10-CM

## 2021-05-02 DIAGNOSIS — Z88.0 ALLERGY STATUS TO PENICILLIN: ICD-10-CM

## 2021-05-02 DIAGNOSIS — R51.9 HEADACHE, UNSPECIFIED: ICD-10-CM

## 2021-05-02 LAB
FLU A RESULT: SIGNIFICANT CHANGE UP
FLU A RESULT: SIGNIFICANT CHANGE UP
FLUAV AG NPH QL: SIGNIFICANT CHANGE UP
FLUBV AG NPH QL: SIGNIFICANT CHANGE UP
RSV RESULT: SIGNIFICANT CHANGE UP
RSV RNA RESP QL NAA+PROBE: SIGNIFICANT CHANGE UP
SARS-COV-2 RNA SPEC QL NAA+PROBE: DETECTED

## 2021-05-02 PROCEDURE — 99284 EMERGENCY DEPT VISIT MOD MDM: CPT

## 2021-05-02 PROCEDURE — 87631 RESP VIRUS 3-5 TARGETS: CPT

## 2021-05-02 PROCEDURE — 99283 EMERGENCY DEPT VISIT LOW MDM: CPT

## 2021-05-02 PROCEDURE — U0005: CPT

## 2021-05-02 PROCEDURE — U0003: CPT

## 2021-05-02 RX ORDER — IBUPROFEN 200 MG
600 TABLET ORAL ONCE
Refills: 0 | Status: COMPLETED | OUTPATIENT
Start: 2021-05-02 | End: 2021-05-02

## 2021-05-02 RX ADMIN — Medication 600 MILLIGRAM(S): at 17:44

## 2021-05-02 NOTE — ED ADULT TRIAGE NOTE - CHIEF COMPLAINT QUOTE
Pt presents reporting 5 days of chills, headaches, body aches, loss of appetite. Occasional cough. No known COVID19 exposure, not vaccinated against COVID19, never had COVID19 in the past. No pmhx.

## 2021-05-02 NOTE — ED PROVIDER NOTE - OBJECTIVE STATEMENT
37 yo F with no pmh c/o subjective fever, chills, ha, bodyaches x 5 days. Reports mild cough. Denies cp, sob, n/v/d, abd pain, recent travel or sick contacts. Has not been vaccinated for covid19.

## 2021-05-02 NOTE — ED ADULT NURSE NOTE - OBJECTIVE STATEMENT
Patient presents to the ED complaining of cough, body aches, chills. for 5 days. Patient reports decreased appetite.

## 2021-05-02 NOTE — ED PROVIDER NOTE - PHYSICAL EXAMINATION
CONSTITUTIONAL: Well-appearing; well-nourished; in no apparent distress.   HEAD: Normocephalic; atraumatic.   EYES: PERRL; EOM intact; conjunctiva and sclera clear  ENT: normal nose; no rhinorrhea; normal pharynx with no erythema or lesions.   NECK: Supple; non-tender;   CARDIOVASCULAR: rrr, no audible murmurs   RESPIRATORY: Breathing easily;   MSK: FROM at all extremities, normal tone   EXT: No cyanosis or edema; N/V intact  SKIN: Normal for age and race; warm; dry; good turgor; no apparent lesions or rash.

## 2021-05-02 NOTE — ED PROVIDER NOTE - CLINICAL SUMMARY MEDICAL DECISION MAKING FREE TEXT BOX
37 yo F with no pmh c/o subjective fever, chills, ha, bodyaches x 5 days. Reports mild cough. Denies cp, sob, n/v/d, abd pain, recent travel or sick contacts. Has not been vaccinated for covid19. T 99.4. O2 sat 97%. Well appearing. Lungs clear. COVID and flu sent. Isolation and return precautions discussed.

## 2021-05-25 DIAGNOSIS — Z71.89 OTHER SPECIFIED COUNSELING: ICD-10-CM

## 2021-07-19 ENCOUNTER — APPOINTMENT (OUTPATIENT)
Dept: PLASTIC SURGERY | Facility: CLINIC | Age: 36
End: 2021-07-19
Payer: SELF-PAY

## 2021-07-19 ENCOUNTER — APPOINTMENT (OUTPATIENT)
Dept: PLASTIC SURGERY | Facility: CLINIC | Age: 36
End: 2021-07-19

## 2021-07-19 PROCEDURE — 99499 UNLISTED E&M SERVICE: CPT

## 2021-07-25 NOTE — HISTORY OF PRESENT ILLNESS
[FreeTextEntry1] : 35 y/o woman presented to review options for cosmetic abdominoplasty.\par Otherwise healthy.\par Exam: excessive subcutaneous tissue and skin over lower abdomen and flanks.\par \par Today we reviewed options for cosmetic abdominoplasty and liposuction.\par NAture of surgery, risks, benefits, alternatives, expected postoperative course, recovery and long term results reviewed. All questions answered. Will coordinate for the near future.

## 2021-09-14 NOTE — ASSESSMENT
Chief Complaint   Patient presents with    Leg Pain     right leg, pt got a tattoo 2 weeks ago that has been infected, tattoo is healed, leg is swollen        1. Have you been to the ER, urgent care clinic since your last visit? Hospitalized since your last visit? Yes When: 08/31/2021 Where: [address] [place] [address]  Reason for visit: wound infection     2. Have you seen or consulted any other health care providers outside of the [address] [place] [address] since your last visit? Include any pap smears or colon screening.  No [FreeTextEntry1] : Reviewed with the patient the risks benefits and alternatives of excision of this subcutaneous mass. I explained that we can perform this under local anesthesia in the office and that we will send a specimen to pathology for evaluation the risks include but are not limited to the risk of bleeding, infection, delayed wound healing, suboptimal scarring, recurrence, and need for revision surgery. \par \par Although this appears to be benign lesion until it is sent for pathology we cannot confirm this. I reviewed scar management treatments including sun avoidance, sunblock, moisturizer, and massage. We will attempt to utilize the natural creases and lines of relaxed and and wrinkles to hide the scar. It will take months and even a few years for the scar to completely mature. All scar start of red, raised and thickened and usually softened, flattened, and leg with time. There is a was a risk of hypertrophic scarring or keloid formation.\par \par I explained the possibility that if this is a keloid there is a 50% chance of recurrence and it can be worse then it is now. \par \par The patient stated that they understood the risks of the procedure we can schedule this at her convenience.\par \par

## 2021-12-01 PROCEDURE — G9005: CPT

## 2023-04-09 NOTE — PATIENT PROFILE OB - SPIRITUAL CULTURAL, CURRENT SITUATION, PROFILE
"Pt arrives for shortness of breath that is persistent the last couple days. No heart or lung history per pt. Reports feeling wheezing in chest and states \"I don't get sick.\" Reports history of asthma and has not had an inhaler for 10+ years. VS stable in triage. No audible wheezes.       "
No

## 2023-08-16 ENCOUNTER — EMERGENCY (EMERGENCY)
Facility: HOSPITAL | Age: 38
LOS: 1 days | Discharge: HOME | End: 2023-08-16
Attending: EMERGENCY MEDICINE | Admitting: EMERGENCY MEDICINE
Payer: COMMERCIAL

## 2023-08-16 VITALS
DIASTOLIC BLOOD PRESSURE: 52 MMHG | SYSTOLIC BLOOD PRESSURE: 110 MMHG | OXYGEN SATURATION: 99 % | WEIGHT: 149.91 LBS | HEART RATE: 62 BPM | RESPIRATION RATE: 16 BRPM | TEMPERATURE: 98 F | HEIGHT: 61 IN

## 2023-08-16 VITALS
HEART RATE: 70 BPM | SYSTOLIC BLOOD PRESSURE: 111 MMHG | RESPIRATION RATE: 16 BRPM | DIASTOLIC BLOOD PRESSURE: 62 MMHG | TEMPERATURE: 98 F | OXYGEN SATURATION: 99 %

## 2023-08-16 DIAGNOSIS — Z88.0 ALLERGY STATUS TO PENICILLIN: ICD-10-CM

## 2023-08-16 DIAGNOSIS — R10.30 LOWER ABDOMINAL PAIN, UNSPECIFIED: ICD-10-CM

## 2023-08-16 DIAGNOSIS — N93.9 ABNORMAL UTERINE AND VAGINAL BLEEDING, UNSPECIFIED: ICD-10-CM

## 2023-08-16 DIAGNOSIS — Z91.013 ALLERGY TO SEAFOOD: ICD-10-CM

## 2023-08-16 DIAGNOSIS — R42 DIZZINESS AND GIDDINESS: ICD-10-CM

## 2023-08-16 LAB
ALBUMIN SERPL ELPH-MCNC: 4 G/DL — SIGNIFICANT CHANGE UP (ref 3.3–5)
ALP SERPL-CCNC: 59 U/L — SIGNIFICANT CHANGE UP (ref 40–120)
ALT FLD-CCNC: <5 U/L — LOW (ref 10–45)
ANION GAP SERPL CALC-SCNC: 8 MMOL/L — SIGNIFICANT CHANGE UP (ref 5–17)
APPEARANCE UR: CLEAR — SIGNIFICANT CHANGE UP
AST SERPL-CCNC: 15 U/L — SIGNIFICANT CHANGE UP (ref 10–40)
BACTERIA # UR AUTO: ABNORMAL /HPF
BASOPHILS # BLD AUTO: 0.05 K/UL — SIGNIFICANT CHANGE UP (ref 0–0.2)
BASOPHILS NFR BLD AUTO: 0.5 % — SIGNIFICANT CHANGE UP (ref 0–2)
BILIRUB SERPL-MCNC: <0.2 MG/DL — SIGNIFICANT CHANGE UP (ref 0.2–1.2)
BILIRUB UR-MCNC: NEGATIVE — SIGNIFICANT CHANGE UP
BUN SERPL-MCNC: 10 MG/DL — SIGNIFICANT CHANGE UP (ref 7–23)
CALCIUM SERPL-MCNC: 9.3 MG/DL — SIGNIFICANT CHANGE UP (ref 8.4–10.5)
CHLORIDE SERPL-SCNC: 106 MMOL/L — SIGNIFICANT CHANGE UP (ref 96–108)
CO2 SERPL-SCNC: 27 MMOL/L — SIGNIFICANT CHANGE UP (ref 22–31)
COD CRY URNS QL: ABNORMAL /HPF
COLOR SPEC: YELLOW — SIGNIFICANT CHANGE UP
CREAT SERPL-MCNC: 0.55 MG/DL — SIGNIFICANT CHANGE UP (ref 0.5–1.3)
DIFF PNL FLD: ABNORMAL
EGFR: 120 ML/MIN/1.73M2 — SIGNIFICANT CHANGE UP
EOSINOPHIL # BLD AUTO: 0.17 K/UL — SIGNIFICANT CHANGE UP (ref 0–0.5)
EOSINOPHIL NFR BLD AUTO: 1.7 % — SIGNIFICANT CHANGE UP (ref 0–6)
EPI CELLS # UR: SIGNIFICANT CHANGE UP /HPF (ref 0–5)
GLUCOSE SERPL-MCNC: 90 MG/DL — SIGNIFICANT CHANGE UP (ref 70–99)
GLUCOSE UR QL: NEGATIVE — SIGNIFICANT CHANGE UP
HCG SERPL-ACNC: <0 MIU/ML — SIGNIFICANT CHANGE UP
HCG UR QL: NEGATIVE — SIGNIFICANT CHANGE UP
HCT VFR BLD CALC: 32.1 % — LOW (ref 34.5–45)
HGB BLD-MCNC: 10.8 G/DL — LOW (ref 11.5–15.5)
IMM GRANULOCYTES NFR BLD AUTO: 0.1 % — SIGNIFICANT CHANGE UP (ref 0–0.9)
KETONES UR-MCNC: NEGATIVE — SIGNIFICANT CHANGE UP
LEUKOCYTE ESTERASE UR-ACNC: ABNORMAL
LYMPHOCYTES # BLD AUTO: 3.75 K/UL — HIGH (ref 1–3.3)
LYMPHOCYTES # BLD AUTO: 38.5 % — SIGNIFICANT CHANGE UP (ref 13–44)
MCHC RBC-ENTMCNC: 31.2 PG — SIGNIFICANT CHANGE UP (ref 27–34)
MCHC RBC-ENTMCNC: 33.6 GM/DL — SIGNIFICANT CHANGE UP (ref 32–36)
MCV RBC AUTO: 92.8 FL — SIGNIFICANT CHANGE UP (ref 80–100)
MONOCYTES # BLD AUTO: 0.67 K/UL — SIGNIFICANT CHANGE UP (ref 0–0.9)
MONOCYTES NFR BLD AUTO: 6.9 % — SIGNIFICANT CHANGE UP (ref 2–14)
NEUTROPHILS # BLD AUTO: 5.09 K/UL — SIGNIFICANT CHANGE UP (ref 1.8–7.4)
NEUTROPHILS NFR BLD AUTO: 52.3 % — SIGNIFICANT CHANGE UP (ref 43–77)
NITRITE UR-MCNC: POSITIVE
NRBC # BLD: 0 /100 WBCS — SIGNIFICANT CHANGE UP (ref 0–0)
PH UR: 6.5 — SIGNIFICANT CHANGE UP (ref 5–8)
PLATELET # BLD AUTO: 256 K/UL — SIGNIFICANT CHANGE UP (ref 150–400)
POTASSIUM SERPL-MCNC: 3.9 MMOL/L — SIGNIFICANT CHANGE UP (ref 3.5–5.3)
POTASSIUM SERPL-SCNC: 3.9 MMOL/L — SIGNIFICANT CHANGE UP (ref 3.5–5.3)
PROT SERPL-MCNC: 7 G/DL — SIGNIFICANT CHANGE UP (ref 6–8.3)
PROT UR-MCNC: ABNORMAL MG/DL
RBC # BLD: 3.46 M/UL — LOW (ref 3.8–5.2)
RBC # FLD: 12.2 % — SIGNIFICANT CHANGE UP (ref 10.3–14.5)
RBC CASTS # UR COMP ASSIST: > 10 /HPF
SODIUM SERPL-SCNC: 141 MMOL/L — SIGNIFICANT CHANGE UP (ref 135–145)
SP GR SPEC: >=1.03 — SIGNIFICANT CHANGE UP (ref 1–1.03)
UROBILINOGEN FLD QL: 0.2 E.U./DL — SIGNIFICANT CHANGE UP
WBC # BLD: 9.74 K/UL — SIGNIFICANT CHANGE UP (ref 3.8–10.5)
WBC # FLD AUTO: 9.74 K/UL — SIGNIFICANT CHANGE UP (ref 3.8–10.5)
WBC UR QL: ABNORMAL /HPF

## 2023-08-16 PROCEDURE — 81001 URINALYSIS AUTO W/SCOPE: CPT

## 2023-08-16 PROCEDURE — 96374 THER/PROPH/DIAG INJ IV PUSH: CPT

## 2023-08-16 PROCEDURE — 87086 URINE CULTURE/COLONY COUNT: CPT

## 2023-08-16 PROCEDURE — 84702 CHORIONIC GONADOTROPIN TEST: CPT

## 2023-08-16 PROCEDURE — 36415 COLL VENOUS BLD VENIPUNCTURE: CPT

## 2023-08-16 PROCEDURE — 99284 EMERGENCY DEPT VISIT MOD MDM: CPT

## 2023-08-16 PROCEDURE — 99284 EMERGENCY DEPT VISIT MOD MDM: CPT | Mod: 25

## 2023-08-16 PROCEDURE — 96361 HYDRATE IV INFUSION ADD-ON: CPT

## 2023-08-16 PROCEDURE — 85025 COMPLETE CBC W/AUTO DIFF WBC: CPT

## 2023-08-16 PROCEDURE — 81025 URINE PREGNANCY TEST: CPT

## 2023-08-16 PROCEDURE — 80053 COMPREHEN METABOLIC PANEL: CPT

## 2023-08-16 PROCEDURE — 87186 SC STD MICRODIL/AGAR DIL: CPT

## 2023-08-16 RX ORDER — SODIUM CHLORIDE 9 MG/ML
1000 INJECTION INTRAMUSCULAR; INTRAVENOUS; SUBCUTANEOUS ONCE
Refills: 0 | Status: COMPLETED | OUTPATIENT
Start: 2023-08-16 | End: 2023-08-16

## 2023-08-16 RX ORDER — KETOROLAC TROMETHAMINE 30 MG/ML
15 SYRINGE (ML) INJECTION ONCE
Refills: 0 | Status: DISCONTINUED | OUTPATIENT
Start: 2023-08-16 | End: 2023-08-16

## 2023-08-16 RX ADMIN — Medication 15 MILLIGRAM(S): at 20:40

## 2023-08-16 RX ADMIN — SODIUM CHLORIDE 1000 MILLILITER(S): 9 INJECTION INTRAMUSCULAR; INTRAVENOUS; SUBCUTANEOUS at 18:51

## 2023-08-16 RX ADMIN — Medication 15 MILLIGRAM(S): at 21:57

## 2023-08-16 RX ADMIN — SODIUM CHLORIDE 1000 MILLILITER(S): 9 INJECTION INTRAMUSCULAR; INTRAVENOUS; SUBCUTANEOUS at 19:59

## 2023-08-16 RX ADMIN — Medication 1 TABLET(S): at 22:37

## 2023-08-16 NOTE — ED PROVIDER NOTE - PHYSICAL EXAMINATION
VITAL SIGNS: I have reviewed nursing notes and confirm.  CONSTITUTIONAL: Well-developed; well-nourished; in no acute distress.  SKIN: Agree with RN documentation regarding decubitus evaluation. Remainder of skin exam is warm and dry, no acute rash.  HEAD: Normocephalic; atraumatic.  EYES: PERRL, EOM intact; conjunctiva and sclera clear.  ENT: No nasal discharge; airway clear.  NECK: Supple; non tender.  CARD: S1, S2 normal; no murmurs, gallops, or rubs. Regular rate and rhythm.  RESP: No wheezes, rales or rhonchi.  ABD: Normal bowel sounds; soft; non-distended; no hepatosplenomegaly. +mild lower abd suprapubic tenderness; no guarding.  EXT: Normal ROM. No clubbing, cyanosis or edema.  LYMPH: No acute cervical adenopathy.  NEURO: Alert, oriented. Grossly unremarkable.  PSYCH: Cooperative, appropriate.

## 2023-08-16 NOTE — ED ADULT NURSE NOTE - NSFALLUNIVINTERV_ED_ALL_ED
Bed/Stretcher in lowest position, wheels locked, appropriate side rails in place/Call bell, personal items and telephone in reach/Instruct patient to call for assistance before getting out of bed/chair/stretcher/Non-slip footwear applied when patient is off stretcher/[place] to call system/Physically safe environment - no spills, clutter or unnecessary equipment/Purposeful proactive rounding/Room/bathroom lighting operational, light cord in reach

## 2023-08-16 NOTE — ED PROVIDER NOTE - OBJECTIVE STATEMENT
39 y/o F generally healthy and not currently sexually active presents to ED c/o menstrual period bleeding which has been heavier than normal. She reports her menstrual period was on time, today is day 5, however now is going through 4-5 pads a day and passing clots. Bleeding has been slowing down but pt was concerned as this is entirely different from her usual menstrual periods which are usually lighter and done by now. Pt has lower abd cramping which is typical of usual menstrual period. Reports feeling a little lightheadedness in the shower today but otherwise no lightheadedness. Denies CP or SOB.

## 2023-08-16 NOTE — ED ADULT NURSE NOTE - CHIEF COMPLAINT QUOTE
heavier than normal vaginal bleeding with menses since Saturday with associated HA; denies blood thinner use

## 2023-08-16 NOTE — ED PROVIDER NOTE - PATIENT PORTAL LINK FT
You can access the FollowMyHealth Patient Portal offered by [facility] by registering at the following website: [email or web address]. By joining [facility]’s FollowMyHealth portal, you will also be able to view your health information using other applications (apps) compatible with our system.

## 2023-08-16 NOTE — ED ADULT NURSE NOTE - OBJECTIVE STATEMENT
Pt is 38 y.o female pt came in c.o vaginal bleeding and cramping since Saturday got worse today with clots and heavy bleeding w/ lightheadedness. Pt A&Ox4. Pt is conversive in full sentences and ambulatory. Pt denies BT use. Denies cp, sob, n/v/d. No known PMHx. Assessment ongoing.

## 2023-08-16 NOTE — ED PROVIDER NOTE - CLINICAL SUMMARY MEDICAL DECISION MAKING FREE TEXT BOX
39 y/o F with heavy menstrual period, HCG negative, hemodynamically stable, hemoglobin 10.8, minimally anemic. Giving saline and Toradol. Pt differed pelvic exam as she does not want to separate from her young autistic 3 y/o and does not want to have this exam in front of her. As bleeding is slowing I do not think pt needs ultrasound at this time. Suspect pt will feel better after Toradol. Will reevaluate. Likely discharge. Will discuss emergent return instructions. 39 y/o F with heavy menstrual period, HCG negative, hemodynamically stable, hemoglobin 10.8, minimally anemic. Giving saline and Toradol. Pt deferred pelvic exam as she does not want to separate from her young autistic 5 y/o and does not want to have this exam in front of her. As bleeding is slowing I do not think pt needs ultrasound at this time. Suspect pt will feel better after Toradol. Will reevaluate. Likely discharge. Will discuss emergent return instructions.

## 2023-08-18 LAB
-  AMPICILLIN/SULBACTAM: SIGNIFICANT CHANGE UP
-  AMPICILLIN: SIGNIFICANT CHANGE UP
-  CEFAZOLIN: SIGNIFICANT CHANGE UP
-  CEFTRIAXONE: SIGNIFICANT CHANGE UP
-  CIPROFLOXACIN: SIGNIFICANT CHANGE UP
-  ERTAPENEM: SIGNIFICANT CHANGE UP
-  GENTAMICIN: SIGNIFICANT CHANGE UP
-  NITROFURANTOIN: SIGNIFICANT CHANGE UP
-  PIPERACILLIN/TAZOBACTAM: SIGNIFICANT CHANGE UP
-  TOBRAMYCIN: SIGNIFICANT CHANGE UP
-  TRIMETHOPRIM/SULFAMETHOXAZOLE: SIGNIFICANT CHANGE UP
METHOD TYPE: SIGNIFICANT CHANGE UP

## 2023-08-18 RX ORDER — NITROFURANTOIN MACROCRYSTAL 50 MG
1 CAPSULE ORAL
Qty: 10 | Refills: 0
Start: 2023-08-18 | End: 2023-08-22

## 2023-08-20 LAB
CULTURE RESULTS: SIGNIFICANT CHANGE UP
ORGANISM # SPEC MICROSCOPIC CNT: SIGNIFICANT CHANGE UP
ORGANISM # SPEC MICROSCOPIC CNT: SIGNIFICANT CHANGE UP
SPECIMEN SOURCE: SIGNIFICANT CHANGE UP

## 2023-11-02 NOTE — ED ADULT TRIAGE NOTE - WEIGHT IN LBS
Normal vision: sees adequately in most situations; can see medication labels, newsprint 130 Xolair Counseling:  Patient informed of potential adverse effects including but not limited to fever, muscle aches, rash and allergic reactions.  The patient verbalized understanding of the proper use and possible adverse effects of Xolair.  All of the patient's questions and concerns were addressed.

## 2024-03-14 NOTE — PROGRESS NOTE ADULT - SUBJECTIVE AND OBJECTIVE BOX
Requested Prescriptions     Signed Prescriptions Disp Refills    amLODIPine-benazepril (LOTREL) 5-20 MG per capsule 90 capsule 1     Sig: Take 1 capsule by mouth daily     Authorizing Provider: MO CALERO     Ordering User: DOMI STEVENS    atorvastatin (LIPITOR) 40 MG tablet 90 tablet 1     Sig: Take 1 tablet by mouth daily     Authorizing Provider: MO CALERO     Ordering User: DOMI STEVENS     Verbal order per Dr. Calero.      Future Appointments   Date Time Provider Department Center   6/12/2024  9:30 AM [facility]   6/24/2024 11:20 AM Mo Calero MD [facility]     Identifiers x 2.  Informed of the above.        Patient evaluated at bedside.   She reports pain is well controlled.    She has been ambulating without assistance, voiding spontaneously, and is breastfeeding.    She denies HA, dizziness, chest pain, palpitations, shortness of breathe, n/v, heavy vaginal bleeding or perineal discomfort.    Physical Exam:  Vital Signs Last 24 Hrs  T(C): 36.8 (16 Nov 2018 02:17), Max: 37.2 (15 Nov 2018 08:20)  T(F): 98.2 (16 Nov 2018 02:17), Max: 99 (15 Nov 2018 08:20)  HR: 76 (16 Nov 2018 02:17) (60 - 76)  BP: 108/- (16 Nov 2018 02:17) (96/46 - 110/56)  BP(mean): --  RR: 17 (16 Nov 2018 02:17) (17 - 18)  SpO2: 96% (16 Nov 2018 02:17) (96% - 99%)    GA: NAD, A+0 x 3  Abd: + BS, soft, nontender, nondistended, no rebound or guarding, uterus firm at midline  : lochia WNL  Extremities: no swelling or calf tenderness                          10.5   13.5  )-----------( 171      ( 15 Nov 2018 07:23 )             32.7     MEDICATIONS  (STANDING):  diphtheria/tetanus/pertussis (acellular) Vaccine (ADAcel) 0.5 milliLiter(s) IntraMuscular once  influenza   Vaccine 0.5 milliLiter(s) IntraMuscular once  oxytocin Infusion 41.667 milliUNIT(s)/Min (125 mL/Hr) IV Continuous <Continuous>  prenatal multivitamin 1 Tablet(s) Oral daily  sodium chloride 0.9% lock flush 3 milliLiter(s) IV Push every 8 hours    MEDICATIONS  (PRN):  acetaminophen   Tablet .. 650 milliGRAM(s) Oral every 6 hours PRN Temp greater or equal to 38.5C (101.3F), Mild Pain (1 - 3)  dibucaine 1% Ointment 1 Application(s) Topical every 4 hours PRN Perineal Discomfort  diphenhydrAMINE 25 milliGRAM(s) Oral every 6 hours PRN Itching  docusate sodium 100 milliGRAM(s) Oral two times a day PRN Stool Softening  glycerin Suppository - Adult 1 Suppository(s) Rectal at bedtime PRN Constipation  hydrocortisone 1% Cream 1 Application(s) Topical every 4 hours PRN Moderate to Severe Perineal Pain  ibuprofen  Tablet. 600 milliGRAM(s) Oral every 6 hours PRN Moderate Pain (4 - 6)  lanolin Ointment 1 Application(s) Topical every 6 hours PRN Sore Nipples  magnesium hydroxide Suspension 30 milliLiter(s) Oral two times a day PRN Constipation  oxyCODONE    5 mG/acetaminophen 325 mG 2 Tablet(s) Oral every 6 hours PRN Severe Pain (7 - 10)  pramoxine 1%/zinc 5% Cream 1 Application(s) Topical every 4 hours PRN Moderate to Severe Perineal Pain  simethicone 80 milliGRAM(s) Chew every 6 hours PRN Gas  witch hazel Pads 1 Application(s) Topical every 4 hours PRN Perineal Discomfort

## 2025-02-26 NOTE — PATIENT PROFILE OB - AMNIOTIC FLUID ODOR, LABOR
No medication changes  Continue risk factor modifications.   Call for any change in symptoms, call to report any changes in shortness of breath or development of chest pain with activity.    Follow up in 12 mos  
normal

## 2025-04-11 NOTE — ED ADULT TRIAGE NOTE - MEANS OF ARRIVAL
Nexplanon placed at [facility] in October 2024 after EAB  Patient satisfied with current contraception    ambulatory

## 2025-06-19 NOTE — ED ADULT NURSE NOTE - CADM POA PRESS ULCER
Name: NARESH NO    Question 1  General Status  Over the last week, have you developed any new or worsening symptoms, such as but not limited to,  shortness of breath, fever, fatigue, nausea, vomiting, or increased pain? If yes please press 1, if no press 2, if you're not sure please press 3, or if you're feeling better press 4.    New Symptoms     Call Status: Callback Later Requested, Answered   Clinical Concerns - Issues  Clinical Concerns - Issues List:  Wrong Answer Selected by Pt/Error - No Needs Identified   Fall Risk - Issues  Fall Risk - Issues List:  Patient has Fallen within the Last Year   Fall Risk - Actions Taken  Fall Risk - Actions List:  Fall Precautions Reviewed     Patient reports yesterday she turned around too fast while in her kitchen as she went to grab the walker and fell on her buttock.  Patient states her grandson lives with her and was able to help her back up.  Patient denies any injuries.  Patient reports she will be receiving home health nursing and PT services. Reviewed fall precautions with patient and patient reports she does follow, wears non slip foot wear and always has her walker. Patient states she has a follow up with her PCP scheduled on 06/26/25 and had medical transportation to her appointments. Patient denies further questions or concerns for writer currently.    No